# Patient Record
Sex: FEMALE | Race: WHITE | NOT HISPANIC OR LATINO
[De-identification: names, ages, dates, MRNs, and addresses within clinical notes are randomized per-mention and may not be internally consistent; named-entity substitution may affect disease eponyms.]

---

## 2018-05-30 ENCOUNTER — APPOINTMENT (OUTPATIENT)
Dept: FAMILY MEDICINE | Facility: CLINIC | Age: 48
End: 2018-05-30
Payer: COMMERCIAL

## 2018-05-30 VITALS
BODY MASS INDEX: 47.09 KG/M2 | HEIGHT: 66 IN | TEMPERATURE: 99.9 F | WEIGHT: 293 LBS | HEART RATE: 104 BPM | OXYGEN SATURATION: 97 %

## 2018-05-30 DIAGNOSIS — Z00.00 ENCOUNTER FOR GENERAL ADULT MEDICAL EXAMINATION W/OUT ABNORMAL FINDINGS: ICD-10-CM

## 2018-05-30 DIAGNOSIS — Z87.09 PERSONAL HISTORY OF OTHER DISEASES OF THE RESPIRATORY SYSTEM: ICD-10-CM

## 2018-05-30 LAB — CYTOLOGY CVX/VAG DOC THIN PREP: NORMAL

## 2018-05-30 PROCEDURE — 99386 PREV VISIT NEW AGE 40-64: CPT | Mod: 25

## 2018-05-30 PROCEDURE — 87880 STREP A ASSAY W/OPTIC: CPT | Mod: QW

## 2018-05-30 NOTE — COUNSELING
[None] : None [Good understanding] : Patient has a good understanding of lifestyle changes and the steps needed to achieve self management goals [Behavioral health counseling provided] : behavioral health

## 2018-05-30 NOTE — PHYSICAL EXAM
[Well Nourished] : well nourished [Normal Oropharynx] : the oropharynx was normal [Clear to Auscultation] : lungs were clear to auscultation bilaterally [Regular Rhythm] : with a regular rhythm [Normal S1, S2] : normal S1 and S2 [Non Tender] : non-tender [No CVA Tenderness] : no CVA  tenderness [No Rash] : no rash

## 2018-05-31 LAB
APPEARANCE: CLEAR
BACTERIA: ABNORMAL
BILIRUBIN URINE: NEGATIVE
BLOOD URINE: ABNORMAL
COLOR: YELLOW
GLUCOSE QUALITATIVE U: NEGATIVE MG/DL
HYALINE CASTS: 2 /LPF
KETONES URINE: NEGATIVE
LEUKOCYTE ESTERASE URINE: NEGATIVE
MICROSCOPIC-UA: NORMAL
NITRITE URINE: NEGATIVE
PH URINE: 7
PROTEIN URINE: NEGATIVE MG/DL
RED BLOOD CELLS URINE: 2 /HPF
SPECIFIC GRAVITY URINE: 1.01
SQUAMOUS EPITHELIAL CELLS: 2 /HPF
UROBILINOGEN URINE: NEGATIVE MG/DL
WHITE BLOOD CELLS URINE: 3 /HPF

## 2018-06-13 ENCOUNTER — OUTPATIENT (OUTPATIENT)
Dept: OUTPATIENT SERVICES | Facility: HOSPITAL | Age: 48
LOS: 1 days | End: 2018-06-13
Payer: COMMERCIAL

## 2018-06-13 VITALS
OXYGEN SATURATION: 95 % | DIASTOLIC BLOOD PRESSURE: 82 MMHG | SYSTOLIC BLOOD PRESSURE: 140 MMHG | WEIGHT: 293 LBS | TEMPERATURE: 98 F | HEIGHT: 66 IN | RESPIRATION RATE: 18 BRPM | HEART RATE: 98 BPM

## 2018-06-13 DIAGNOSIS — M79.631 PAIN IN RIGHT FOREARM: ICD-10-CM

## 2018-06-13 DIAGNOSIS — Z98.890 OTHER SPECIFIED POSTPROCEDURAL STATES: Chronic | ICD-10-CM

## 2018-06-13 DIAGNOSIS — M79.644 PAIN IN RIGHT FINGER(S): ICD-10-CM

## 2018-06-13 DIAGNOSIS — M65.4 RADIAL STYLOID TENOSYNOVITIS [DE QUERVAIN]: ICD-10-CM

## 2018-06-13 DIAGNOSIS — M25.531 PAIN IN RIGHT WRIST: ICD-10-CM

## 2018-06-13 DIAGNOSIS — Z01.818 ENCOUNTER FOR OTHER PREPROCEDURAL EXAMINATION: ICD-10-CM

## 2018-06-13 LAB
ANION GAP SERPL CALC-SCNC: 8 MMOL/L — SIGNIFICANT CHANGE UP (ref 5–17)
APTT BLD: 31.2 SEC — SIGNIFICANT CHANGE UP (ref 27.5–37.4)
BUN SERPL-MCNC: 13 MG/DL — SIGNIFICANT CHANGE UP (ref 7–23)
CALCIUM SERPL-MCNC: 9.4 MG/DL — SIGNIFICANT CHANGE UP (ref 8.5–10.1)
CHLORIDE SERPL-SCNC: 105 MMOL/L — SIGNIFICANT CHANGE UP (ref 96–108)
CO2 SERPL-SCNC: 27 MMOL/L — SIGNIFICANT CHANGE UP (ref 22–31)
CREAT SERPL-MCNC: 0.78 MG/DL — SIGNIFICANT CHANGE UP (ref 0.5–1.3)
GLUCOSE SERPL-MCNC: 98 MG/DL — SIGNIFICANT CHANGE UP (ref 70–99)
HCG SERPL-ACNC: <1 MIU/ML — SIGNIFICANT CHANGE UP
HCT VFR BLD CALC: 39.1 % — SIGNIFICANT CHANGE UP (ref 34.5–45)
HGB BLD-MCNC: 12.7 G/DL — SIGNIFICANT CHANGE UP (ref 11.5–15.5)
INR BLD: 1.2 RATIO — HIGH (ref 0.88–1.16)
MCHC RBC-ENTMCNC: 28 PG — SIGNIFICANT CHANGE UP (ref 27–34)
MCHC RBC-ENTMCNC: 32.5 GM/DL — SIGNIFICANT CHANGE UP (ref 32–36)
MCV RBC AUTO: 86.3 FL — SIGNIFICANT CHANGE UP (ref 80–100)
NRBC # BLD: 0 /100 WBCS — SIGNIFICANT CHANGE UP (ref 0–0)
PLATELET # BLD AUTO: 279 K/UL — SIGNIFICANT CHANGE UP (ref 150–400)
POTASSIUM SERPL-MCNC: 4.1 MMOL/L — SIGNIFICANT CHANGE UP (ref 3.5–5.3)
POTASSIUM SERPL-SCNC: 4.1 MMOL/L — SIGNIFICANT CHANGE UP (ref 3.5–5.3)
PROTHROM AB SERPL-ACNC: 13.1 SEC — HIGH (ref 9.8–12.7)
RBC # BLD: 4.53 M/UL — SIGNIFICANT CHANGE UP (ref 3.8–5.2)
RBC # FLD: 14.1 % — SIGNIFICANT CHANGE UP (ref 10.3–14.5)
SODIUM SERPL-SCNC: 140 MMOL/L — SIGNIFICANT CHANGE UP (ref 135–145)
WBC # BLD: 11.68 K/UL — HIGH (ref 3.8–10.5)
WBC # FLD AUTO: 11.68 K/UL — HIGH (ref 3.8–10.5)

## 2018-06-13 PROCEDURE — 84702 CHORIONIC GONADOTROPIN TEST: CPT

## 2018-06-13 PROCEDURE — 71046 X-RAY EXAM CHEST 2 VIEWS: CPT

## 2018-06-13 PROCEDURE — 93005 ELECTROCARDIOGRAM TRACING: CPT

## 2018-06-13 PROCEDURE — 80048 BASIC METABOLIC PNL TOTAL CA: CPT

## 2018-06-13 PROCEDURE — G0463: CPT

## 2018-06-13 PROCEDURE — 93010 ELECTROCARDIOGRAM REPORT: CPT | Mod: NC

## 2018-06-13 PROCEDURE — 85610 PROTHROMBIN TIME: CPT

## 2018-06-13 PROCEDURE — 85730 THROMBOPLASTIN TIME PARTIAL: CPT

## 2018-06-13 PROCEDURE — 85027 COMPLETE CBC AUTOMATED: CPT

## 2018-06-13 PROCEDURE — 71046 X-RAY EXAM CHEST 2 VIEWS: CPT | Mod: 26

## 2018-06-13 NOTE — H&P PST ADULT - NSANTHOSAYNRD_GEN_A_CORE
No. DENILSON screening performed.  STOP BANG Legend: 0-2 = LOW Risk; 3-4 = INTERMEDIATE Risk; 5-8 = HIGH Risk

## 2018-06-13 NOTE — H&P PST ADULT - RS GEN PE MLT RESP DETAILS PC
good air movement/respirations non-labored/breath sounds equal/normal/clear to auscultation bilaterally/airway patent

## 2018-06-13 NOTE — H&P PST ADULT - ASSESSMENT
46 y/o female , obese, with c/o pain in the right thumb, scheduled for right radial tunnel release. Pre op testing today. Was seen by PCP 2 weeks ago. Pt stopped taking Phenteramine 2 days ago. Pre op testing today.

## 2018-06-13 NOTE — H&P PST ADULT - HISTORY OF PRESENT ILLNESS
46 y/o obese female 48 y/o obese female with c/o pain in the right thumb on and off for about a year. Takes cannabinoids for painful arthritis. Was seen by Dr Santiago recently, scheduled for right radial tunnel release.

## 2018-06-18 RX ORDER — ACETAMINOPHEN 500 MG
650 TABLET ORAL ONCE
Qty: 0 | Refills: 0 | Status: COMPLETED | OUTPATIENT
Start: 2018-06-20 | End: 2018-06-20

## 2018-06-18 RX ORDER — SODIUM CHLORIDE 9 MG/ML
1000 INJECTION, SOLUTION INTRAVENOUS
Qty: 0 | Refills: 0 | Status: DISCONTINUED | OUTPATIENT
Start: 2018-06-20 | End: 2018-06-20

## 2018-06-19 ENCOUNTER — TRANSCRIPTION ENCOUNTER (OUTPATIENT)
Age: 48
End: 2018-06-19

## 2018-06-19 NOTE — HISTORY OF PRESENT ILLNESS
[FreeTextEntry1] : est [de-identified] : 47 year old female here to establish care and for a full physical examination. The patients complete medical, family and social history was documented and reviewed with the patient.  The patients medications were identified and also reviewed with the patient as well as any allergies to any medications. All active and previous medical problems were identified and discussed with the patient.  Any new problems were documented. Patient is feeling well today.\par going for radial tunnel surgery\par very upset with weight loss, on weight watchers and feels she is losing too slow\par patient also with complaints of sore throat for a few days

## 2018-06-19 NOTE — ASSESSMENT
[FreeTextEntry1] : Patient was counseled on healthy eating habits, on daily exercise and stress relief. All medications and allergies were reviewed with the patient and any adjustments necessary were made. Patient was counseled to try engage in an exercise activity for at least 30 minutes 3-4 times a week.  Patient was advised to eat a diet low in fat and carbohydrates and high in protein, with plenty of vegetables. Patient was advised to try and engage in relaxing activities whenever possible.\par The patients blood was draw in office and will be followed and assessed for any issues.  Patient was told to return to the office if any issues arise.  Unless otherwise stated, the patient is to continue all other medications as previously prescribed.\par \par The diagnosis of obesity was discussed with the patient. The patient was counseled on diet and exercise. Patient was advised to eat a diet low in carbohydrates and low in fat with high protein diet with plenty of vegetables. Patient was counseled to eat small meals throughout the day avoiding carbohydrates, foods high in fat, foods that are fried and fast food.  Patient was advised to monitor fluid intake, to drink at least 8 glasses of pure water a day and reduce/stop intake of all sugar drinks including juice and soda. Patient was advised to try and exercise for 30-40 minutes per day for at least three days a week. Pros and cons of using medical management for weight loss versus diet/exercise alone was discussed. Medication options were provided for the patient and side affects and risks were identified and discussed.  Patient was advised to take any medications as prescribed and to call office or go to the ER immediately if any issues with the medications occur. All questions were answered.\par \par A rapid strep test was done in office due to symptoms consistent with possible pharyngeal infection. Patients results were [ negative] for strep.\par \par

## 2018-06-19 NOTE — HEALTH RISK ASSESSMENT
[No falls in past year] : Patient reported no falls in the past year [0] : 2) Feeling down, depressed, or hopeless: Not at all (0) [Patient reported mammogram was normal] : Patient reported mammogram was normal [Patient reported PAP Smear was normal] : Patient reported PAP Smear was normal [With Significant Other] : lives with significant other [Employed] : employed [] :  [# Of Children ___] : has [unfilled] children [Fully functional (bathing, dressing, toileting, transferring, walking, feeding)] : Fully functional (bathing, dressing, toileting, transferring, walking, feeding) [Fully functional (using the telephone, shopping, preparing meals, housekeeping, doing laundry, using] : Fully functional and needs no help or supervision to perform IADLs (using the telephone, shopping, preparing meals, housekeeping, doing laundry, using transportation, managing medications and managing finances) [Discussed at today's visit] : Advance Directives Discussed at today's visit [] : No [MammogramDate] : 2017 [PapSmearDate] : 2018

## 2018-06-19 NOTE — ADDENDUM
[FreeTextEntry1] :  All Pre-surgical testing reviewed. Patient is medically optimize to undergo radial tunnel surgery.

## 2018-06-20 ENCOUNTER — OUTPATIENT (OUTPATIENT)
Dept: OUTPATIENT SERVICES | Facility: HOSPITAL | Age: 48
LOS: 1 days | End: 2018-06-20
Payer: COMMERCIAL

## 2018-06-20 ENCOUNTER — RESULT REVIEW (OUTPATIENT)
Age: 48
End: 2018-06-20

## 2018-06-20 VITALS
OXYGEN SATURATION: 96 % | TEMPERATURE: 100 F | SYSTOLIC BLOOD PRESSURE: 151 MMHG | HEART RATE: 103 BPM | WEIGHT: 293 LBS | DIASTOLIC BLOOD PRESSURE: 88 MMHG | HEIGHT: 66 IN | RESPIRATION RATE: 18 BRPM

## 2018-06-20 VITALS
SYSTOLIC BLOOD PRESSURE: 146 MMHG | TEMPERATURE: 99 F | OXYGEN SATURATION: 99 % | HEART RATE: 96 BPM | RESPIRATION RATE: 16 BRPM | DIASTOLIC BLOOD PRESSURE: 84 MMHG

## 2018-06-20 DIAGNOSIS — Z01.818 ENCOUNTER FOR OTHER PREPROCEDURAL EXAMINATION: ICD-10-CM

## 2018-06-20 DIAGNOSIS — M79.644 PAIN IN RIGHT FINGER(S): ICD-10-CM

## 2018-06-20 DIAGNOSIS — M79.631 PAIN IN RIGHT FOREARM: ICD-10-CM

## 2018-06-20 DIAGNOSIS — M65.4 RADIAL STYLOID TENOSYNOVITIS [DE QUERVAIN]: ICD-10-CM

## 2018-06-20 DIAGNOSIS — Z98.890 OTHER SPECIFIED POSTPROCEDURAL STATES: Chronic | ICD-10-CM

## 2018-06-20 PROCEDURE — 88304 TISSUE EXAM BY PATHOLOGIST: CPT | Mod: 26

## 2018-06-20 PROCEDURE — 64708 REVISE ARM/LEG NERVE: CPT | Mod: RT

## 2018-06-20 PROCEDURE — 25447 ARTHRP NTRCRP/CRP/MTCR NTRPS: CPT | Mod: RT

## 2018-06-20 PROCEDURE — 88304 TISSUE EXAM BY PATHOLOGIST: CPT

## 2018-06-20 RX ORDER — OXYCODONE AND ACETAMINOPHEN 5; 325 MG/1; MG/1
1 TABLET ORAL EVERY 4 HOURS
Qty: 0 | Refills: 0 | Status: DISCONTINUED | OUTPATIENT
Start: 2018-06-20 | End: 2018-06-21

## 2018-06-20 RX ORDER — ONDANSETRON 8 MG/1
4 TABLET, FILM COATED ORAL ONCE
Qty: 0 | Refills: 0 | Status: DISCONTINUED | OUTPATIENT
Start: 2018-06-20 | End: 2018-06-21

## 2018-06-20 RX ORDER — FEXOFENADINE HCL 30 MG
1 TABLET ORAL
Qty: 0 | Refills: 0 | COMMUNITY

## 2018-06-20 RX ORDER — SODIUM CHLORIDE 9 MG/ML
1000 INJECTION, SOLUTION INTRAVENOUS
Qty: 0 | Refills: 0 | Status: DISCONTINUED | OUTPATIENT
Start: 2018-06-20 | End: 2018-06-21

## 2018-06-20 RX ORDER — CEFAZOLIN SODIUM 1 G
3000 VIAL (EA) INJECTION ONCE
Qty: 0 | Refills: 0 | Status: COMPLETED | OUTPATIENT
Start: 2018-06-20 | End: 2018-06-20

## 2018-06-20 RX ORDER — HYDROMORPHONE HYDROCHLORIDE 2 MG/ML
0.5 INJECTION INTRAMUSCULAR; INTRAVENOUS; SUBCUTANEOUS
Qty: 0 | Refills: 0 | Status: DISCONTINUED | OUTPATIENT
Start: 2018-06-20 | End: 2018-06-21

## 2018-06-20 RX ORDER — ALPRAZOLAM 0.25 MG
1 TABLET ORAL
Qty: 0 | Refills: 0 | COMMUNITY

## 2018-06-20 RX ORDER — MEPERIDINE HYDROCHLORIDE 50 MG/ML
12.5 INJECTION INTRAMUSCULAR; INTRAVENOUS; SUBCUTANEOUS
Qty: 0 | Refills: 0 | Status: DISCONTINUED | OUTPATIENT
Start: 2018-06-20 | End: 2018-06-21

## 2018-06-20 RX ORDER — PHENTERMINE HCL 30 MG
0 CAPSULE ORAL
Qty: 0 | Refills: 0 | COMMUNITY

## 2018-06-20 RX ADMIN — SODIUM CHLORIDE 75 MILLILITER(S): 9 INJECTION, SOLUTION INTRAVENOUS at 10:12

## 2018-06-20 RX ADMIN — Medication 650 MILLIGRAM(S): at 10:12

## 2018-06-20 NOTE — ASU DISCHARGE PLAN (ADULT/PEDIATRIC). - NOTIFY
Fever greater than 101/Pain not relieved by Medications/Numbness, color, or temperature change to extremity/Swelling that continues/Persistent Nausea and Vomiting/Bleeding that does not stop

## 2018-06-20 NOTE — ASU DISCHARGE PLAN (ADULT/PEDIATRIC). - MEDICATION SUMMARY - MEDICATIONS TO TAKE
I will START or STAY ON the medications listed below when I get home from the hospital:    cannabinoids  -- few drops under tongue as needed for pain  -- Indication: For Pain of finger of right hand    phentermine 8 mg oral tablet  -- once daily  -- Indication: For Pain of right forearm    Allegra 180 mg oral tablet  -- 1 tab(s) by mouth once a day  -- Indication: For Pain of right forearm    Xanax 0.25 mg oral tablet  -- 1 tab(s) by mouth prn, As Needed  -- Indication: For Pain of right forearm    NuvaRing 0.120 mg-0.015 mg/24 hours vaginal ring  -- 1 each vaginally every 4 weeks  -- Indication: For Pain of right forearm

## 2018-06-20 NOTE — BRIEF OPERATIVE NOTE - PROCEDURE
<<-----Click on this checkbox to enter Procedure Neuroplasty of major peripheral nerve of arm or leg  06/20/2018    Active  BPINSKY

## 2018-06-22 NOTE — PROGRESS NOTE ADULT - SUBJECTIVE AND OBJECTIVE BOX
Post Op Day _2_ of Anesthesia Pain Management Service    SUBJECTIVE:  The patient is doing well		  OBJECTIVE:   Pain Score:   /10  Therapy:	[ ] IV PCA	[ ] Epidural   [ ] s/p Spinal Opioid	[ ] Peripheral nerve block  	  Vital Signs Last 24 Hrs  T(C): --  T(F): --  HR: --  BP: --  BP(mean): --  RR: --  SpO2: --    ( ) Alert & Oriented     ( ) No motor/sensory block     ( ) Nausea     ( ) Pruritis     ( ) Headache    ( ) Catheter Site Unremarkable    Assessment of Catheter Site:	[ ] Intact		[ ] Other:    ( ) Further Pain Rx Plan:  Oral Pain Medications    COMMENTS:  Phone follow up performed.  Patient was comfortable, numbness is resolving.  Overall patient is doing well.      ANTICOAGULATION STATUS:

## 2018-06-27 ENCOUNTER — APPOINTMENT (OUTPATIENT)
Dept: FAMILY MEDICINE | Facility: CLINIC | Age: 48
End: 2018-06-27
Payer: COMMERCIAL

## 2018-06-27 VITALS
SYSTOLIC BLOOD PRESSURE: 130 MMHG | HEIGHT: 66 IN | OXYGEN SATURATION: 98 % | WEIGHT: 293 LBS | DIASTOLIC BLOOD PRESSURE: 90 MMHG | HEART RATE: 93 BPM | BODY MASS INDEX: 47.09 KG/M2 | BODY MASS INDEX: 47.09 KG/M2 | HEIGHT: 66 IN | WEIGHT: 293 LBS

## 2018-06-27 PROCEDURE — 99213 OFFICE O/P EST LOW 20 MIN: CPT

## 2018-06-27 NOTE — HISTORY OF PRESENT ILLNESS
[de-identified] : 47 year old female is here for a followup visit after surgery on her right arm and for weight loss. Medications and allergies were reviewed and assessed.  There has been no new medications since the last visit. Patient is feeling well with no active changes or issues since Her last visit.\par patient did well with the phentermine.\par

## 2018-06-27 NOTE — ASSESSMENT
[FreeTextEntry1] : The diagnosis of obesity was discussed with the patient. The patient was counseled on diet and exercise. Patient was advised to eat a diet low in carbohydrates and low in fat with high protein diet with plenty of vegetables. Patient was counseled to eat small meals throughout the day avoiding carbohydrates, foods high in fat, foods that are fried and fast food.  Patient was advised to monitor fluid intake, to drink at least 8 glasses of pure water a day and reduce/stop intake of all sugar drinks including juice and soda. Patient was advised to try and exercise for 30-40 minutes per day for at least three days a week. Pros and cons of using medical management for weight loss versus diet/exercise alone was discussed. Medication options were provided for the patient and side affects and risks were identified and discussed.  Patient was advised to take any medications as prescribed and to call office or go to the ER immediately if any issues with the medications occur. All questions were answered.\par \par lost 10 pounds with 3 weeks of active meds.  feeling great\par continue\par \par surgery, going to surgeon tomorrow, feeling good\par \par

## 2018-06-27 NOTE — HEALTH RISK ASSESSMENT
[Patient reported mammogram was normal] : Patient reported mammogram was normal [Patient reported PAP Smear was normal] : Patient reported PAP Smear was normal [With Significant Other] : lives with significant other [Employed] : employed [] :  [# Of Children ___] : has [unfilled] children [Fully functional (bathing, dressing, toileting, transferring, walking, feeding)] : Fully functional (bathing, dressing, toileting, transferring, walking, feeding) [Fully functional (using the telephone, shopping, preparing meals, housekeeping, doing laundry, using] : Fully functional and needs no help or supervision to perform IADLs (using the telephone, shopping, preparing meals, housekeeping, doing laundry, using transportation, managing medications and managing finances) [MammogramDate] : 2017 [PapSmearDate] : 2018 [Discussed at today's visit] : Advance Directives Discussed at today's visit

## 2018-08-01 ENCOUNTER — APPOINTMENT (OUTPATIENT)
Dept: FAMILY MEDICINE | Facility: CLINIC | Age: 48
End: 2018-08-01
Payer: COMMERCIAL

## 2018-08-01 VITALS
BODY MASS INDEX: 47.09 KG/M2 | HEART RATE: 90 BPM | WEIGHT: 293 LBS | DIASTOLIC BLOOD PRESSURE: 100 MMHG | SYSTOLIC BLOOD PRESSURE: 140 MMHG | HEIGHT: 66 IN | OXYGEN SATURATION: 98 % | RESPIRATION RATE: 16 BRPM

## 2018-08-01 PROBLEM — M19.90 UNSPECIFIED OSTEOARTHRITIS, UNSPECIFIED SITE: Chronic | Status: ACTIVE | Noted: 2018-06-13

## 2018-08-01 PROBLEM — T78.40XA ALLERGY, UNSPECIFIED, INITIAL ENCOUNTER: Chronic | Status: ACTIVE | Noted: 2018-06-13

## 2018-08-01 PROBLEM — E66.9 OBESITY, UNSPECIFIED: Chronic | Status: ACTIVE | Noted: 2018-06-13

## 2018-08-01 PROCEDURE — 99214 OFFICE O/P EST MOD 30 MIN: CPT

## 2018-08-01 NOTE — HISTORY OF PRESENT ILLNESS
[de-identified] : 47 year old female is here for a followup visit for obesity. Lost 3 pounds. Medications and allergies were reviewed and assessed.  There has been no new medications since the last visit. Patient is feeling well with no active changes or issues since Her last visit.\par patient did well with the phentermine.\par

## 2018-08-01 NOTE — HEALTH RISK ASSESSMENT
[No falls in past year] : Patient reported no falls in the past year [0] : 2) Feeling down, depressed, or hopeless: Not at all (0) [Patient reported mammogram was normal] : Patient reported mammogram was normal [Patient reported PAP Smear was normal] : Patient reported PAP Smear was normal [With Significant Other] : lives with significant other [Employed] : employed [] :  [# Of Children ___] : has [unfilled] children [Fully functional (bathing, dressing, toileting, transferring, walking, feeding)] : Fully functional (bathing, dressing, toileting, transferring, walking, feeding) [Fully functional (using the telephone, shopping, preparing meals, housekeeping, doing laundry, using] : Fully functional and needs no help or supervision to perform IADLs (using the telephone, shopping, preparing meals, housekeeping, doing laundry, using transportation, managing medications and managing finances) [Discussed at today's visit] : Advance Directives Discussed at today's visit [] : No [[id number]] : 0 [MammogramDate] : 2017 [PapSmearDate] : 2018

## 2018-08-29 ENCOUNTER — APPOINTMENT (OUTPATIENT)
Dept: FAMILY MEDICINE | Facility: CLINIC | Age: 48
End: 2018-08-29
Payer: COMMERCIAL

## 2018-08-29 VITALS
SYSTOLIC BLOOD PRESSURE: 120 MMHG | HEIGHT: 66 IN | WEIGHT: 293 LBS | BODY MASS INDEX: 47.09 KG/M2 | DIASTOLIC BLOOD PRESSURE: 80 MMHG

## 2018-08-29 PROCEDURE — 99213 OFFICE O/P EST LOW 20 MIN: CPT

## 2018-08-29 NOTE — HISTORY OF PRESENT ILLNESS
[de-identified] : 47 year old female is here for a followup visit for obesity management.  Patent gained one pound. Medications and allergies were reviewed and assessed.  There has been no new medications since the last visit. Patient is feeling well with no active changes or issues since Her last visit.\par \par

## 2018-08-29 NOTE — ASSESSMENT
[FreeTextEntry1] : The diagnosis of obesity was discussed with the patient. The patient was counseled on diet and exercise. Patient was advised to eat a diet low in carbohydrates and low in fat with high protein diet with plenty of vegetables. Patient was counseled to eat small meals throughout the day avoiding carbohydrates, foods high in fat, foods that are fried and fast food.  Patient was advised to monitor fluid intake, to drink at least 8 glasses of pure water a day and reduce/stop intake of all sugar drinks including juice and soda. Patient was advised to try and exercise for 30-40 minutes per day for at least three days a week. Pros and cons of using medical management for weight loss versus diet/exercise alone was discussed. Medication options were provided for the patient and side affects and risks were identified and discussed.  Patient was advised to take any medications as prescribed and to call office or go to the ER immediately if any issues with the medications occur. All questions were answered.\par \par lost 15 pounds in total, 30 in total on own.  feeling great but is upset that she gained one pound.  not feeling affects anymore\par continue\par \par \par \par

## 2018-08-29 NOTE — HEALTH RISK ASSESSMENT
[] : No [No falls in past year] : Patient reported no falls in the past year [0] : 2) Feeling down, depressed, or hopeless: Not at all (0) [[id number]] : 0 [Patient reported mammogram was normal] : Patient reported mammogram was normal [Patient reported PAP Smear was normal] : Patient reported PAP Smear was normal [With Significant Other] : lives with significant other [Employed] : employed [] :  [# Of Children ___] : has [unfilled] children [Fully functional (bathing, dressing, toileting, transferring, walking, feeding)] : Fully functional (bathing, dressing, toileting, transferring, walking, feeding) [Fully functional (using the telephone, shopping, preparing meals, housekeeping, doing laundry, using] : Fully functional and needs no help or supervision to perform IADLs (using the telephone, shopping, preparing meals, housekeeping, doing laundry, using transportation, managing medications and managing finances) [MammogramDate] : 2017 [PapSmearDate] : 2018 [Discussed at today's visit] : Advance Directives Discussed at today's visit

## 2018-09-26 ENCOUNTER — APPOINTMENT (OUTPATIENT)
Dept: FAMILY MEDICINE | Facility: CLINIC | Age: 48
End: 2018-09-26
Payer: COMMERCIAL

## 2018-09-26 VITALS
WEIGHT: 290.2 LBS | OXYGEN SATURATION: 97 % | DIASTOLIC BLOOD PRESSURE: 78 MMHG | HEART RATE: 105 BPM | HEIGHT: 66 IN | RESPIRATION RATE: 16 BRPM | BODY MASS INDEX: 46.64 KG/M2 | SYSTOLIC BLOOD PRESSURE: 126 MMHG

## 2018-09-26 PROCEDURE — 99214 OFFICE O/P EST MOD 30 MIN: CPT

## 2018-09-26 NOTE — HEALTH RISK ASSESSMENT
[] : No [No falls in past year] : Patient reported no falls in the past year [0] : 2) Feeling down, depressed, or hopeless: Not at all (0) [[id number]] : 0 [Patient reported mammogram was normal] : Patient reported mammogram was normal [Patient reported PAP Smear was normal] : Patient reported PAP Smear was normal [With Significant Other] : lives with significant other [Employed] : employed [] :  [# Of Children ___] : has [unfilled] children [Fully functional (bathing, dressing, toileting, transferring, walking, feeding)] : Fully functional (bathing, dressing, toileting, transferring, walking, feeding) [Fully functional (using the telephone, shopping, preparing meals, housekeeping, doing laundry, using] : Fully functional and needs no help or supervision to perform IADLs (using the telephone, shopping, preparing meals, housekeeping, doing laundry, using transportation, managing medications and managing finances) [MammogramDate] : 2017 [PapSmearDate] : 2018 [Discussed at today's visit] : Advance Directives Discussed at today's visit

## 2018-09-26 NOTE — ASSESSMENT
[FreeTextEntry1] : The diagnosis of obesity was discussed with the patient. The patient was counseled on diet and exercise. Patient was advised to eat a diet low in carbohydrates and low in fat with high protein diet with plenty of vegetables. Patient was counseled to eat small meals throughout the day avoiding carbohydrates, foods high in fat, foods that are fried and fast food.  Patient was advised to monitor fluid intake, to drink at least 8 glasses of pure water a day and reduce/stop intake of all sugar drinks including juice and soda. Patient was advised to try and exercise for 30-40 minutes per day for at least three days a week. Pros and cons of using medical management for weight loss versus diet/exercise alone was discussed. Medication options were provided for the patient and side affects and risks were identified and discussed.  Patient was advised to take any medications as prescribed and to call office or go to the ER immediately if any issues with the medications occur. All questions were answered.\par \par lost 21 pounds in total, 37 in total on own.  feeling great. still feeling affects\par \par arm is healing, doing well\par \par \par \par

## 2018-09-26 NOTE — HISTORY OF PRESENT ILLNESS
[de-identified] : 47 year old female is here for a followup visit for obesity management.  lost 7 pounds since last month, total 21 pounds. Medications and allergies were reviewed and assessed.  There has been no new medications since the last visit. Patient is feeling well with no active changes or issues since Her last visit.\par \par

## 2018-09-26 NOTE — PHYSICAL EXAM
[Well Nourished] : well nourished [Normal Oropharynx] : the oropharynx was normal [Clear to Auscultation] : lungs were clear to auscultation bilaterally [Regular Rhythm] : with a regular rhythm [Normal S1, S2] : normal S1 and S2 [Non Tender] : non-tender [No CVA Tenderness] : no CVA  tenderness [No Rash] : no rash [Normal Gait] : normal gait [Normal Affect] : the affect was normal [Normal Insight/Judgement] : insight and judgment were intact

## 2018-10-24 ENCOUNTER — MEDICATION RENEWAL (OUTPATIENT)
Age: 48
End: 2018-10-24

## 2018-11-01 ENCOUNTER — MEDICATION RENEWAL (OUTPATIENT)
Age: 48
End: 2018-11-01

## 2018-11-28 ENCOUNTER — APPOINTMENT (OUTPATIENT)
Dept: FAMILY MEDICINE | Facility: CLINIC | Age: 48
End: 2018-11-28
Payer: COMMERCIAL

## 2018-11-28 VITALS
BODY MASS INDEX: 41.98 KG/M2 | HEART RATE: 100 BPM | WEIGHT: 277 LBS | HEIGHT: 68 IN | RESPIRATION RATE: 18 BRPM | OXYGEN SATURATION: 96 %

## 2018-11-28 LAB — CYTOLOGY CVX/VAG DOC THIN PREP: NORMAL

## 2018-11-28 PROCEDURE — 99214 OFFICE O/P EST MOD 30 MIN: CPT

## 2018-11-28 NOTE — HISTORY OF PRESENT ILLNESS
[de-identified] : 47 year old female is here for a followup visit for obesity management.  lost 13 pounds in 2 months, total of 50. Medications and allergies were reviewed and assessed.  There has been no new medications since the last visit. Patient is feeling well with no active changes or issues since Her last visit.\par \par

## 2018-11-28 NOTE — HEALTH RISK ASSESSMENT
[] : No [No falls in past year] : Patient reported no falls in the past year [0] : 2) Feeling down, depressed, or hopeless: Not at all (0) [[id number]] : 0 [Patient reported mammogram was normal] : Patient reported mammogram was normal [Patient reported PAP Smear was normal] : Patient reported PAP Smear was normal [With Significant Other] : lives with significant other [Employed] : employed [] :  [# Of Children ___] : has [unfilled] children [Fully functional (bathing, dressing, toileting, transferring, walking, feeding)] : Fully functional (bathing, dressing, toileting, transferring, walking, feeding) [Fully functional (using the telephone, shopping, preparing meals, housekeeping, doing laundry, using] : Fully functional and needs no help or supervision to perform IADLs (using the telephone, shopping, preparing meals, housekeeping, doing laundry, using transportation, managing medications and managing finances) [MammogramDate] : 2017 [PapSmearDate] : 2018 [Discussed at today's visit] : Advance Directives Discussed at today's visit

## 2018-11-28 NOTE — ASSESSMENT
[FreeTextEntry1] : The diagnosis of obesity was discussed with the patient. The patient was counseled on diet and exercise. Patient was advised to eat a diet low in carbohydrates and low in fat with high protein diet with plenty of vegetables. Patient was counseled to eat small meals throughout the day avoiding carbohydrates, foods high in fat, foods that are fried and fast food.  Patient was advised to monitor fluid intake, to drink at least 8 glasses of pure water a day and reduce/stop intake of all sugar drinks including juice and soda. Patient was advised to try and exercise for 30-40 minutes per day for at least three days a week. Pros and cons of using medical management for weight loss versus diet/exercise alone was discussed. Medication options were provided for the patient and side affects and risks were identified and discussed.  Patient was advised to take any medications as prescribed and to call office or go to the ER immediately if any issues with the medications occur. All questions were answered.\par \par lost 13 pounds in two weeks, 50 in total on own.  feeling great. still feeling affects\par \par arm is healing, but is needed injections\par \par patient having issues at work\par \par \par \par

## 2018-12-29 ENCOUNTER — TRANSCRIPTION ENCOUNTER (OUTPATIENT)
Age: 48
End: 2018-12-29

## 2019-01-07 ENCOUNTER — TRANSCRIPTION ENCOUNTER (OUTPATIENT)
Age: 49
End: 2019-01-07

## 2019-01-30 ENCOUNTER — APPOINTMENT (OUTPATIENT)
Dept: FAMILY MEDICINE | Facility: CLINIC | Age: 49
End: 2019-01-30
Payer: COMMERCIAL

## 2019-01-30 VITALS
DIASTOLIC BLOOD PRESSURE: 90 MMHG | BODY MASS INDEX: 43.9 KG/M2 | SYSTOLIC BLOOD PRESSURE: 146 MMHG | HEIGHT: 66 IN | WEIGHT: 273.13 LBS

## 2019-01-30 PROCEDURE — 99214 OFFICE O/P EST MOD 30 MIN: CPT

## 2019-01-30 NOTE — HISTORY OF PRESENT ILLNESS
[de-identified] : 47 year old female is here for a followup visit for obesity management.  lost 13 pounds in 2 months, total of 50. Medications and allergies were reviewed and assessed.  There has been no new medications since the last visit. Patient is feeling well with no active changes or issues since Her last visit.\par \par

## 2019-01-30 NOTE — ASSESSMENT
[FreeTextEntry1] : The diagnosis of obesity was discussed with the patient. The patient was counseled on diet and exercise. Patient was advised to eat a diet low in carbohydrates and low in fat with high protein diet with plenty of vegetables. Patient was counseled to eat small meals throughout the day avoiding carbohydrates, foods high in fat, foods that are fried and fast food.  Patient was advised to monitor fluid intake, to drink at least 8 glasses of pure water a day and reduce/stop intake of all sugar drinks including juice and soda. Patient was advised to try and exercise for 30-40 minutes per day for at least three days a week. Pros and cons of using medical management for weight loss versus diet/exercise alone was discussed. Medication options were provided for the patient and side affects and risks were identified and discussed.  Patient was advised to take any medications as prescribed and to call office or go to the ER immediately if any issues with the medications occur. All questions were answered.\par \par lost 31 pounds in 6 months, 54 in total on own.  feeling great. still feeling affects\par has been off for meds for a month and would like to restart\par \par \par arm is healing, but is needed injections\par \par patient having issues at work\par \par \par \par

## 2019-01-30 NOTE — HEALTH RISK ASSESSMENT
[No falls in past year] : Patient reported no falls in the past year [0] : 2) Feeling down, depressed, or hopeless: Not at all (0) [Patient reported mammogram was normal] : Patient reported mammogram was normal [Patient reported PAP Smear was normal] : Patient reported PAP Smear was normal [With Significant Other] : lives with significant other [Employed] : employed [] :  [# Of Children ___] : has [unfilled] children [Fully functional (bathing, dressing, toileting, transferring, walking, feeding)] : Fully functional (bathing, dressing, toileting, transferring, walking, feeding) [Fully functional (using the telephone, shopping, preparing meals, housekeeping, doing laundry, using] : Fully functional and needs no help or supervision to perform IADLs (using the telephone, shopping, preparing meals, housekeeping, doing laundry, using transportation, managing medications and managing finances) [Discussed at today's visit] : Advance Directives Discussed at today's visit [] : No [[id number]] : 0 [MammogramDate] : 2017 [PapSmearDate] : 2018

## 2019-03-03 ENCOUNTER — TRANSCRIPTION ENCOUNTER (OUTPATIENT)
Age: 49
End: 2019-03-03

## 2019-03-04 ENCOUNTER — TRANSCRIPTION ENCOUNTER (OUTPATIENT)
Age: 49
End: 2019-03-04

## 2019-03-27 ENCOUNTER — APPOINTMENT (OUTPATIENT)
Dept: FAMILY MEDICINE | Facility: CLINIC | Age: 49
End: 2019-03-27
Payer: COMMERCIAL

## 2019-03-27 VITALS
HEIGHT: 66 IN | RESPIRATION RATE: 18 BRPM | HEART RATE: 72 BPM | DIASTOLIC BLOOD PRESSURE: 80 MMHG | BODY MASS INDEX: 43.39 KG/M2 | WEIGHT: 270 LBS | SYSTOLIC BLOOD PRESSURE: 126 MMHG | OXYGEN SATURATION: 92 %

## 2019-03-27 PROCEDURE — 99214 OFFICE O/P EST MOD 30 MIN: CPT

## 2019-03-27 NOTE — HEALTH RISK ASSESSMENT
[] : No [No falls in past year] : Patient reported no falls in the past year [0] : 2) Feeling down, depressed, or hopeless: Not at all (0) [[id number]] : 0 [Patient reported mammogram was normal] : Patient reported mammogram was normal [Patient reported PAP Smear was normal] : Patient reported PAP Smear was normal [With Significant Other] : lives with significant other [Employed] : employed [] :  [# Of Children ___] : has [unfilled] children [Fully functional (bathing, dressing, toileting, transferring, walking, feeding)] : Fully functional (bathing, dressing, toileting, transferring, walking, feeding) [Fully functional (using the telephone, shopping, preparing meals, housekeeping, doing laundry, using] : Fully functional and needs no help or supervision to perform IADLs (using the telephone, shopping, preparing meals, housekeeping, doing laundry, using transportation, managing medications and managing finances) [MammogramDate] : 2017 [PapSmearDate] : 2018 [Discussed at today's visit] : Advance Directives Discussed at today's visit

## 2019-03-27 NOTE — ASSESSMENT
[FreeTextEntry1] : The diagnosis of obesity was discussed with the patient. The patient was counseled on diet and exercise. Patient was advised to eat a diet low in carbohydrates and low in fat with high protein diet with plenty of vegetables. Patient was counseled to eat small meals throughout the day avoiding carbohydrates, foods high in fat, foods that are fried and fast food.  Patient was advised to monitor fluid intake, to drink at least 8 glasses of pure water a day and reduce/stop intake of all sugar drinks including juice and soda. Patient was advised to try and exercise for 30-40 minutes per day for at least three days a week. Pros and cons of using medical management for weight loss versus diet/exercise alone was discussed. Medication options were provided for the patient and side affects and risks were identified and discussed.  Patient was advised to take any medications as prescribed and to call office or go to the ER immediately if any issues with the medications occur. All questions were answered.\par \par  57 in total on own.  feeling great. still feeling affects\par has been off for meds for a month and would like to restart\par \par doing great\par wishes to restart\par \par \par \par

## 2019-03-27 NOTE — HISTORY OF PRESENT ILLNESS
[de-identified] : 47 year old female is here for a followup visit for obesity management.  total of 57. Medications and allergies were reviewed and assessed.  There has been no new medications since the last visit. Patient is feeling well with no active changes or issues since Her last visit.\par \par

## 2019-05-02 ENCOUNTER — TRANSCRIPTION ENCOUNTER (OUTPATIENT)
Age: 49
End: 2019-05-02

## 2019-05-29 ENCOUNTER — APPOINTMENT (OUTPATIENT)
Dept: FAMILY MEDICINE | Facility: CLINIC | Age: 49
End: 2019-05-29

## 2019-08-12 ENCOUNTER — APPOINTMENT (OUTPATIENT)
Dept: FAMILY MEDICINE | Facility: CLINIC | Age: 49
End: 2019-08-12
Payer: COMMERCIAL

## 2019-08-12 VITALS
WEIGHT: 261 LBS | SYSTOLIC BLOOD PRESSURE: 140 MMHG | HEIGHT: 66 IN | DIASTOLIC BLOOD PRESSURE: 100 MMHG | BODY MASS INDEX: 41.95 KG/M2

## 2019-08-12 DIAGNOSIS — Z87.09 PERSONAL HISTORY OF OTHER DISEASES OF THE RESPIRATORY SYSTEM: ICD-10-CM

## 2019-08-12 PROCEDURE — 99214 OFFICE O/P EST MOD 30 MIN: CPT

## 2019-08-12 NOTE — PHYSICAL EXAM
[Well Nourished] : well nourished [Clear to Auscultation] : lungs were clear to auscultation bilaterally [Normal Oropharynx] : the oropharynx was normal [Regular Rhythm] : with a regular rhythm [Normal S1, S2] : normal S1 and S2 [Non Tender] : non-tender [No CVA Tenderness] : no CVA  tenderness [No Rash] : no rash [Normal Gait] : normal gait [Normal Affect] : the affect was normal [Normal Insight/Judgement] : insight and judgment were intact

## 2019-08-12 NOTE — HISTORY OF PRESENT ILLNESS
[de-identified] : 48 year old female is here for a followup visit for obesity management.  total of 68. Medications and allergies were reviewed and assessed.  There has been no new medications since the last visit. Patient is feeling well with no active changes or issues since Her last visit.\par \par

## 2019-08-12 NOTE — ASSESSMENT
[FreeTextEntry1] : OBESITY\par The diagnosis of obesity was discussed with the patient. The patient was counseled on diet and exercise. Patient was advised to eat a diet low in carbohydrates and low in fat with high protein diet with plenty of vegetables. Patient was counseled to eat small meals throughout the day avoiding carbohydrates, foods high in fat, foods that are fried and fast food.  Patient was advised to monitor fluid intake, to drink at least 8 glasses of pure water a day and reduce/stop intake of all sugar drinks including juice and soda. Patient was advised to try and exercise for 30-40 minutes per day for at least three days a week. Pros and cons of using medical management for weight loss versus diet/exercise alone was discussed. Medication options were provided for the patient and side affects and risks were identified and discussed.  Patient was advised to take any medications as prescribed and to call office or go to the ER immediately if any issues with the medications occur. All questions were answered.\par \par  68 pounds in total on own.  feeling great. still feeling affects\par has been off for meds for three months and would like to restart\par feels she is at a standstill and would like to restart\par \par HYPERTENSION\par The patient has a diagnosis of hypertension.   The diagnosis was discussed with patient and need for medication compliance and possible side affects and risks of noncompliance. 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

## 2019-08-12 NOTE — HEALTH RISK ASSESSMENT
[] : No [No falls in past year] : Patient reported no falls in the past year [0] : 2) Feeling down, depressed, or hopeless: Not at all (0) [[id number]] : 0 [Patient reported mammogram was normal] : Patient reported mammogram was normal [Patient reported PAP Smear was normal] : Patient reported PAP Smear was normal [With Significant Other] : lives with significant other [Employed] : employed [] :  [Fully functional (bathing, dressing, toileting, transferring, walking, feeding)] : Fully functional (bathing, dressing, toileting, transferring, walking, feeding) [# Of Children ___] : has [unfilled] children [Fully functional (using the telephone, shopping, preparing meals, housekeeping, doing laundry, using] : Fully functional and needs no help or supervision to perform IADLs (using the telephone, shopping, preparing meals, housekeeping, doing laundry, using transportation, managing medications and managing finances) [MammogramDate] : 2017 [PapSmearDate] : 2018 [Discussed at today's visit] : Advance Directives Discussed at today's visit

## 2019-10-14 ENCOUNTER — APPOINTMENT (OUTPATIENT)
Dept: FAMILY MEDICINE | Facility: CLINIC | Age: 49
End: 2019-10-14

## 2019-10-22 ENCOUNTER — TRANSCRIPTION ENCOUNTER (OUTPATIENT)
Age: 49
End: 2019-10-22

## 2020-01-16 ENCOUNTER — TRANSCRIPTION ENCOUNTER (OUTPATIENT)
Age: 50
End: 2020-01-16

## 2020-01-17 ENCOUNTER — APPOINTMENT (OUTPATIENT)
Dept: FAMILY MEDICINE | Facility: CLINIC | Age: 50
End: 2020-01-17
Payer: COMMERCIAL

## 2020-01-17 VITALS
BODY MASS INDEX: 41.78 KG/M2 | HEIGHT: 66 IN | WEIGHT: 260 LBS | SYSTOLIC BLOOD PRESSURE: 130 MMHG | DIASTOLIC BLOOD PRESSURE: 90 MMHG

## 2020-01-17 LAB — CYTOLOGY CVX/VAG DOC THIN PREP: NORMAL

## 2020-01-17 PROCEDURE — 99214 OFFICE O/P EST MOD 30 MIN: CPT

## 2020-01-17 NOTE — ASSESSMENT
[FreeTextEntry1] : OBESITY\par The diagnosis of obesity was discussed with the patient. The patient was counseled on diet and exercise. Patient was advised to eat a diet low in carbohydrates and low in fat with high protein diet with plenty of vegetables. Patient was counseled to eat small meals throughout the day avoiding carbohydrates, foods high in fat, foods that are fried and fast food.  Patient was advised to monitor fluid intake, to drink at least 8 glasses of pure water a day and reduce/stop intake of all sugar drinks including juice and soda. Patient was advised to try and exercise for 30-40 minutes per day for at least three days a week. Pros and cons of using medical management for weight loss versus diet/exercise alone was discussed. Medication options were provided for the patient and side affects and risks were identified and discussed.  Patient was advised to take any medications as prescribed and to call office or go to the ER immediately if any issues with the medications occur. All questions were answered.\par \par  68 pounds in total on own.  feeling great. still feeling affects\par has been off for meds for three months and would like to restart\par feels she is at a standstill and would like to restart\par \par HYPERTENSION\par The patient has a diagnosis of hypertension.   The diagnosis was discussed with patient and need for medication compliance and possible side affects and risks of noncompliance. 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

## 2020-01-17 NOTE — HEALTH RISK ASSESSMENT
[] : No [No falls in past year] : Patient reported no falls in the past year [0] : 2) Feeling down, depressed, or hopeless: Not at all (0) [[id number]] : 0 [Patient reported mammogram was normal] : Patient reported mammogram was normal [Patient reported PAP Smear was normal] : Patient reported PAP Smear was normal [With Significant Other] : lives with significant other [Employed] : employed [] :  [# Of Children ___] : has [unfilled] children [Fully functional (bathing, dressing, toileting, transferring, walking, feeding)] : Fully functional (bathing, dressing, toileting, transferring, walking, feeding) [Fully functional (using the telephone, shopping, preparing meals, housekeeping, doing laundry, using] : Fully functional and needs no help or supervision to perform IADLs (using the telephone, shopping, preparing meals, housekeeping, doing laundry, using transportation, managing medications and managing finances) [MammogramDate] : 2017 [PapSmearDate] : 2018 [Discussed at today's visit] : Advance Directives Discussed at today's visit

## 2020-01-17 NOTE — HISTORY OF PRESENT ILLNESS
[de-identified] : 49 year old female is here for a followup visit for obesity management.  total of 68. Medications and allergies were reviewed and assessed.  There has been no new medications since the last visit. Patient is feeling well with no active changes or issues since Her last visit.\par \par

## 2020-01-17 NOTE — PHYSICAL EXAM
[Well Nourished] : well nourished [Clear to Auscultation] : lungs were clear to auscultation bilaterally [Regular Rhythm] : with a regular rhythm [Normal S1, S2] : normal S1 and S2 [No CVA Tenderness] : no CVA  tenderness [No Rash] : no rash [Normal Gait] : normal gait [Normal Affect] : the affect was normal [Normal Insight/Judgement] : insight and judgment were intact

## 2020-02-18 ENCOUNTER — NON-APPOINTMENT (OUTPATIENT)
Age: 50
End: 2020-02-18

## 2020-02-18 ENCOUNTER — APPOINTMENT (OUTPATIENT)
Dept: FAMILY MEDICINE | Facility: CLINIC | Age: 50
End: 2020-02-18
Payer: COMMERCIAL

## 2020-02-18 VITALS
SYSTOLIC BLOOD PRESSURE: 130 MMHG | TEMPERATURE: 98.4 F | WEIGHT: 260 LBS | DIASTOLIC BLOOD PRESSURE: 80 MMHG | BODY MASS INDEX: 41.78 KG/M2 | HEART RATE: 86 BPM | HEIGHT: 66 IN | OXYGEN SATURATION: 98 %

## 2020-02-18 DIAGNOSIS — Z01.818 ENCOUNTER FOR OTHER PREPROCEDURAL EXAMINATION: ICD-10-CM

## 2020-02-18 DIAGNOSIS — E07.9 DISORDER OF THYROID, UNSPECIFIED: ICD-10-CM

## 2020-02-18 PROCEDURE — 36415 COLL VENOUS BLD VENIPUNCTURE: CPT

## 2020-02-18 PROCEDURE — 99214 OFFICE O/P EST MOD 30 MIN: CPT | Mod: 25

## 2020-02-18 PROCEDURE — 93000 ELECTROCARDIOGRAM COMPLETE: CPT

## 2020-02-18 RX ORDER — LEVOTHYROXINE SODIUM 0.03 MG/1
25 TABLET ORAL
Qty: 30 | Refills: 0 | Status: DISCONTINUED | COMMUNITY
Start: 2019-11-15 | End: 2020-02-18

## 2020-02-19 LAB
ALBUMIN SERPL ELPH-MCNC: 4.5 G/DL
ALP BLD-CCNC: 111 U/L
ALT SERPL-CCNC: 16 U/L
ANION GAP SERPL CALC-SCNC: 14 MMOL/L
AST SERPL-CCNC: 10 U/L
BASOPHILS # BLD AUTO: 0.03 K/UL
BASOPHILS NFR BLD AUTO: 0.2 %
BILIRUB SERPL-MCNC: 0.9 MG/DL
BUN SERPL-MCNC: 12 MG/DL
CALCIUM SERPL-MCNC: 9.7 MG/DL
CHLORIDE SERPL-SCNC: 99 MMOL/L
CHOLEST SERPL-MCNC: 190 MG/DL
CHOLEST/HDLC SERPL: 2.8 RATIO
CO2 SERPL-SCNC: 28 MMOL/L
CREAT SERPL-MCNC: 0.65 MG/DL
EOSINOPHIL # BLD AUTO: 0.02 K/UL
EOSINOPHIL NFR BLD AUTO: 0.1 %
ESTIMATED AVERAGE GLUCOSE: 100 MG/DL
GLUCOSE SERPL-MCNC: 85 MG/DL
HBA1C MFR BLD HPLC: 5.1 %
HCG SERPL-MCNC: <1 MIU/ML
HCT VFR BLD CALC: 45.6 %
HDLC SERPL-MCNC: 67 MG/DL
HGB BLD-MCNC: 14.3 G/DL
IMM GRANULOCYTES NFR BLD AUTO: 0.4 %
LDLC SERPL CALC-MCNC: 102 MG/DL
LYMPHOCYTES # BLD AUTO: 2.22 K/UL
LYMPHOCYTES NFR BLD AUTO: 16.4 %
MAN DIFF?: NORMAL
MCHC RBC-ENTMCNC: 29.4 PG
MCHC RBC-ENTMCNC: 31.4 GM/DL
MCV RBC AUTO: 93.6 FL
MONOCYTES # BLD AUTO: 0.79 K/UL
MONOCYTES NFR BLD AUTO: 5.8 %
NEUTROPHILS # BLD AUTO: 10.4 K/UL
NEUTROPHILS NFR BLD AUTO: 77.1 %
PLATELET # BLD AUTO: 254 K/UL
POTASSIUM SERPL-SCNC: 4.4 MMOL/L
PROT SERPL-MCNC: 7.1 G/DL
RBC # BLD: 4.87 M/UL
RBC # FLD: 13.6 %
SODIUM SERPL-SCNC: 141 MMOL/L
TRIGL SERPL-MCNC: 101 MG/DL
TSH SERPL-ACNC: 3.72 UIU/ML
WBC # FLD AUTO: 13.51 K/UL

## 2020-02-19 NOTE — ASSESSMENT
[Patient Optimized for Surgery] : Patient optimized for surgery [No Further Testing Recommended] : no further testing recommended [As per surgery] : as per surgery [FreeTextEntry4] : 49 year old female  is here for medical clearance for an upcoming surgery for liposuction of buffalo hump and under eye area.  All medical problems and medicines were documented and reviewed with the patient. \par Patient was counseled to stop any advil/alieve/aspirin 7 days prior to surgery and was advised to have nothing by mouth from 11 pm the night prior to surgery. \par Medications were reviewed with patient and patient was directed on which medications to be taken and not to be taken prior to surgery.

## 2020-02-19 NOTE — HISTORY OF PRESENT ILLNESS
[No Pertinent Cardiac History] : no history of aortic stenosis, atrial fibrillation, coronary artery disease, recent myocardial infarction, or implantable device/pacemaker [No Pertinent Pulmonary History] : no history of asthma, COPD, sleep apnea, or smoking [No Adverse Anesthesia Reaction] : no adverse anesthesia reaction in self or family member [(Patient denies any chest pain, claudication, dyspnea on exertion, orthopnea, palpitations or syncope)] : Patient denies any chest pain, claudication, dyspnea on exertion, orthopnea, palpitations or syncope [Chronic Anticoagulation] : no chronic anticoagulation [Chronic Kidney Disease] : no chronic kidney disease [Diabetes] : no diabetes [FreeTextEntry1] : liposuction of buffalo hump [FreeTextEntry2] : 3/2/2020 [FreeTextEntry3] : Dr. Hassan [FreeTextEntry4] : 49 year old female  is here for medical clearance for an upcoming surgery for liposuction of buffalo hump and under eye area.  All medical problems and medicines were documented and reviewed with the patient. \par Patient was counseled to stop any advil/alieve/aspirin 7 days prior to surgery and was advised to have nothing by mouth from 11 pm the night prior to surgery. \par Medications were reviewed with patient and patient was directed on which medications to be taken and not to be taken prior to surgery.\par Labs were reviewed with the patient.\par

## 2020-04-29 ENCOUNTER — TRANSCRIPTION ENCOUNTER (OUTPATIENT)
Age: 50
End: 2020-04-29

## 2020-05-13 ENCOUNTER — APPOINTMENT (OUTPATIENT)
Dept: FAMILY MEDICINE | Facility: CLINIC | Age: 50
End: 2020-05-13
Payer: COMMERCIAL

## 2020-05-13 VITALS — WEIGHT: 261 LBS | HEIGHT: 66 IN | BODY MASS INDEX: 41.95 KG/M2

## 2020-05-13 DIAGNOSIS — F41.9 ANXIETY DISORDER, UNSPECIFIED: ICD-10-CM

## 2020-05-13 PROCEDURE — 99214 OFFICE O/P EST MOD 30 MIN: CPT | Mod: 95

## 2020-05-13 NOTE — PHYSICAL EXAM
[No Acute Distress] : no acute distress [Well Nourished] : well nourished [Well Developed] : well developed [Well-Appearing] : well-appearing [No JVD] : no jugular venous distention [No Respiratory Distress] : no respiratory distress  [Normal Affect] : the affect was normal [Normal Insight/Judgement] : insight and judgment were intact [Normal Mood] : the mood was normal

## 2020-05-13 NOTE — HISTORY OF PRESENT ILLNESS
[Home] : at home, [unfilled] , at the time of the visit. [Medical Office: ([facility])___] : at the medical office located in  [Patient] : the patient [Self] : self

## 2020-05-13 NOTE — ASSESSMENT
[FreeTextEntry1] : OBESITY\par The diagnosis of obesity was discussed with the patient. The patient was counseled on diet and exercise. Patient was advised to eat a diet low in carbohydrates and low in fat with high protein diet with plenty of vegetables. Patient was counseled to eat small meals throughout the day avoiding carbohydrates, foods high in fat, foods that are fried and fast food. Patient was advised to monitor fluid intake, to drink at least 8 glasses of pure water a day and reduce/stop intake of all sugar drinks including juice and soda. Patient was advised to try and exercise for 30-40 minutes per day for at least three days a week. Pros and cons of using medical management for weight loss versus diet/exercise alone was discussed. Medication options were provided for the patient and side affects and risks were identified and discussed. Patient was advised to take any medications as prescribed and to call office or go to the ER immediately if any issues with the medications occur. All questions were answered.\par \par  68 pounds in total on own. feeling great. still feeling affects\par has been off for meds for three months and would like to restart\par feels she is at a standstill and would like to restart, gained one point\par \par HYPERTENSION\par The patient has a diagnosis of hypertension. The diagnosis was discussed with patient and need for medication compliance and possible side affects and risks of noncompliance. 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

## 2020-12-16 PROBLEM — Z87.09 HISTORY OF ACUTE PHARYNGITIS: Status: RESOLVED | Noted: 2018-05-30 | Resolved: 2020-12-16

## 2022-05-28 NOTE — ASSESSMENT
[FreeTextEntry1] : The diagnosis of obesity was discussed with the patient. The patient was counseled on diet and exercise. Patient was advised to eat a diet low in carbohydrates and low in fat with high protein diet with plenty of vegetables. Patient was counseled to eat small meals throughout the day avoiding carbohydrates, foods high in fat, foods that are fried and fast food.  Patient was advised to monitor fluid intake, to drink at least 8 glasses of pure water a day and reduce/stop intake of all sugar drinks including juice and soda. Patient was advised to try and exercise for 30-40 minutes per day for at least three days a week. Pros and cons of using medical management for weight loss versus diet/exercise alone was discussed. Medication options were provided for the patient and side affects and risks were identified and discussed.  Patient was advised to take any medications as prescribed and to call office or go to the ER immediately if any issues with the medications occur. All questions were answered.\par \par lost 16 pounds in total, 30 in total on own.  feeling great\par continue\par \par \par \par  30y  @ 19w6d admitted for cervical insufficieny to be discharged home on progesterone

## 2022-10-07 NOTE — H&P PST ADULT - TEACHING/LEARNING CULTURAL CONSIDERATIONS
Called patient to discuss adding Neulasta to tx plan  Reviewed SEs and benefit of Claritin around time of chemo/injection  She is agreeable  none

## 2023-10-09 ENCOUNTER — EMERGENCY (EMERGENCY)
Facility: HOSPITAL | Age: 53
LOS: 1 days | Discharge: ANOTHER HOSPITAL | End: 2023-10-09
Attending: EMERGENCY MEDICINE | Admitting: EMERGENCY MEDICINE
Payer: COMMERCIAL

## 2023-10-09 ENCOUNTER — INPATIENT (INPATIENT)
Facility: HOSPITAL | Age: 53
LOS: 1 days | Discharge: HOME | DRG: 164 | End: 2023-10-11
Attending: INTERNAL MEDICINE | Admitting: INTERNAL MEDICINE
Payer: COMMERCIAL

## 2023-10-09 VITALS
SYSTOLIC BLOOD PRESSURE: 155 MMHG | TEMPERATURE: 98 F | DIASTOLIC BLOOD PRESSURE: 90 MMHG | RESPIRATION RATE: 19 BRPM | OXYGEN SATURATION: 96 % | HEART RATE: 112 BPM

## 2023-10-09 VITALS
RESPIRATION RATE: 20 BRPM | HEIGHT: 66 IN | WEIGHT: 229.06 LBS | DIASTOLIC BLOOD PRESSURE: 86 MMHG | HEART RATE: 130 BPM | OXYGEN SATURATION: 93 % | TEMPERATURE: 98 F | SYSTOLIC BLOOD PRESSURE: 137 MMHG

## 2023-10-09 VITALS
SYSTOLIC BLOOD PRESSURE: 137 MMHG | OXYGEN SATURATION: 93 % | HEART RATE: 115 BPM | DIASTOLIC BLOOD PRESSURE: 81 MMHG | HEIGHT: 66 IN | WEIGHT: 229.94 LBS | TEMPERATURE: 98 F | RESPIRATION RATE: 20 BRPM

## 2023-10-09 DIAGNOSIS — Z98.890 OTHER SPECIFIED POSTPROCEDURAL STATES: Chronic | ICD-10-CM

## 2023-10-09 DIAGNOSIS — I26.99 OTHER PULMONARY EMBOLISM WITHOUT ACUTE COR PULMONALE: ICD-10-CM

## 2023-10-09 LAB
ALBUMIN SERPL ELPH-MCNC: 2.5 G/DL — LOW (ref 3.3–5)
ALBUMIN SERPL ELPH-MCNC: 3.2 G/DL — LOW (ref 3.3–5)
ALP SERPL-CCNC: 78 U/L — SIGNIFICANT CHANGE UP (ref 40–120)
ALP SERPL-CCNC: 80 U/L — SIGNIFICANT CHANGE UP (ref 40–120)
ALT FLD-CCNC: 26 U/L — SIGNIFICANT CHANGE UP (ref 10–45)
ALT FLD-CCNC: 29 U/L — SIGNIFICANT CHANGE UP (ref 12–78)
ANION GAP SERPL CALC-SCNC: 10 MMOL/L — SIGNIFICANT CHANGE UP (ref 5–17)
ANION GAP SERPL CALC-SCNC: 17 MMOL/L — SIGNIFICANT CHANGE UP (ref 5–17)
APTT BLD: 31.7 SEC — SIGNIFICANT CHANGE UP (ref 24.5–35.6)
APTT BLD: 84.4 SEC — HIGH (ref 24.5–35.6)
AST SERPL-CCNC: 18 U/L — SIGNIFICANT CHANGE UP (ref 15–37)
AST SERPL-CCNC: 46 U/L — HIGH (ref 10–40)
BASOPHILS # BLD AUTO: 0.02 K/UL — SIGNIFICANT CHANGE UP (ref 0–0.2)
BASOPHILS NFR BLD AUTO: 0.1 % — SIGNIFICANT CHANGE UP (ref 0–2)
BASOPHILS NFR BLD AUTO: 0.2 % — SIGNIFICANT CHANGE UP (ref 0–2)
BILIRUB SERPL-MCNC: 0.6 MG/DL — SIGNIFICANT CHANGE UP (ref 0.2–1.2)
BUN SERPL-MCNC: 11 MG/DL — SIGNIFICANT CHANGE UP (ref 7–23)
BUN SERPL-MCNC: 9 MG/DL — SIGNIFICANT CHANGE UP (ref 7–23)
CALCIUM SERPL-MCNC: 8.5 MG/DL — SIGNIFICANT CHANGE UP (ref 8.5–10.1)
CALCIUM SERPL-MCNC: 9.2 MG/DL — SIGNIFICANT CHANGE UP (ref 8.4–10.5)
CHLORIDE SERPL-SCNC: 106 MMOL/L — SIGNIFICANT CHANGE UP (ref 96–108)
CHLORIDE SERPL-SCNC: 108 MMOL/L — SIGNIFICANT CHANGE UP (ref 96–108)
CK MB CFR SERPL CALC: 2.1 NG/ML — SIGNIFICANT CHANGE UP (ref 0–3.6)
CO2 SERPL-SCNC: 18 MMOL/L — LOW (ref 22–31)
CO2 SERPL-SCNC: 23 MMOL/L — SIGNIFICANT CHANGE UP (ref 22–31)
CREAT SERPL-MCNC: 0.52 MG/DL — SIGNIFICANT CHANGE UP (ref 0.5–1.3)
CREAT SERPL-MCNC: 0.8 MG/DL — SIGNIFICANT CHANGE UP (ref 0.5–1.3)
D DIMER BLD IA.RAPID-MCNC: 6497 NG/ML DDU — HIGH
EGFR: 113 ML/MIN/1.73M2 — SIGNIFICANT CHANGE UP
EGFR: 90 ML/MIN/1.73M2 — SIGNIFICANT CHANGE UP
EOSINOPHIL # BLD AUTO: 0.02 K/UL — SIGNIFICANT CHANGE UP (ref 0–0.5)
EOSINOPHIL # BLD AUTO: 0.04 K/UL — SIGNIFICANT CHANGE UP (ref 0–0.5)
EOSINOPHIL NFR BLD AUTO: 0.1 % — SIGNIFICANT CHANGE UP (ref 0–6)
EOSINOPHIL NFR BLD AUTO: 0.3 % — SIGNIFICANT CHANGE UP (ref 0–6)
GLUCOSE SERPL-MCNC: 108 MG/DL — HIGH (ref 70–99)
GLUCOSE SERPL-MCNC: 169 MG/DL — HIGH (ref 70–99)
HCG SERPL-ACNC: <1 MIU/ML — SIGNIFICANT CHANGE UP
HCT VFR BLD CALC: 38.7 % — SIGNIFICANT CHANGE UP (ref 34.5–45)
HCT VFR BLD CALC: 39 % — SIGNIFICANT CHANGE UP (ref 34.5–45)
HGB BLD-MCNC: 12.3 G/DL — SIGNIFICANT CHANGE UP (ref 11.5–15.5)
HGB BLD-MCNC: 12.4 G/DL — SIGNIFICANT CHANGE UP (ref 11.5–15.5)
IMM GRANULOCYTES NFR BLD AUTO: 0.4 % — SIGNIFICANT CHANGE UP (ref 0–0.9)
INR BLD: 1.12 RATIO — SIGNIFICANT CHANGE UP (ref 0.85–1.18)
INR BLD: 1.2 RATIO — HIGH (ref 0.85–1.18)
LYMPHOCYTES # BLD AUTO: 1.39 K/UL — SIGNIFICANT CHANGE UP (ref 1–3.3)
LYMPHOCYTES # BLD AUTO: 1.76 K/UL — SIGNIFICANT CHANGE UP (ref 1–3.3)
LYMPHOCYTES # BLD AUTO: 10.1 % — LOW (ref 13–44)
LYMPHOCYTES # BLD AUTO: 14.2 % — SIGNIFICANT CHANGE UP (ref 13–44)
MAGNESIUM SERPL-MCNC: 2.1 MG/DL — SIGNIFICANT CHANGE UP (ref 1.6–2.6)
MCHC RBC-ENTMCNC: 29.6 PG — SIGNIFICANT CHANGE UP (ref 27–34)
MCHC RBC-ENTMCNC: 31.8 GM/DL — LOW (ref 32–36)
MCV RBC AUTO: 93.1 FL — SIGNIFICANT CHANGE UP (ref 80–100)
MCV RBC AUTO: 93.3 FL — SIGNIFICANT CHANGE UP (ref 80–100)
MONOCYTES # BLD AUTO: 0.64 K/UL — SIGNIFICANT CHANGE UP (ref 0–0.9)
MONOCYTES # BLD AUTO: 0.86 K/UL — SIGNIFICANT CHANGE UP (ref 0–0.9)
MONOCYTES NFR BLD AUTO: 4.7 % — SIGNIFICANT CHANGE UP (ref 2–14)
MONOCYTES NFR BLD AUTO: 6.9 % — SIGNIFICANT CHANGE UP (ref 2–14)
NEUTROPHILS # BLD AUTO: 11.58 K/UL — HIGH (ref 1.8–7.4)
NEUTROPHILS # BLD AUTO: 9.69 K/UL — HIGH (ref 1.8–7.4)
NEUTROPHILS NFR BLD AUTO: 78 % — HIGH (ref 43–77)
NEUTROPHILS NFR BLD AUTO: 84.6 % — HIGH (ref 43–77)
NRBC # BLD: 0 /100 WBCS — SIGNIFICANT CHANGE UP (ref 0–0)
NT-PROBNP SERPL-SCNC: 4465 PG/ML — HIGH (ref 0–300)
NT-PROBNP SERPL-SCNC: 5130 PG/ML — HIGH (ref 0–125)
PLATELET # BLD AUTO: 176 K/UL — SIGNIFICANT CHANGE UP (ref 150–400)
PLATELET # BLD AUTO: 190 K/UL — SIGNIFICANT CHANGE UP (ref 150–400)
POTASSIUM SERPL-MCNC: 4 MMOL/L — SIGNIFICANT CHANGE UP (ref 3.5–5.3)
POTASSIUM SERPL-MCNC: 5.2 MMOL/L — SIGNIFICANT CHANGE UP (ref 3.5–5.3)
POTASSIUM SERPL-SCNC: 4 MMOL/L — SIGNIFICANT CHANGE UP (ref 3.5–5.3)
POTASSIUM SERPL-SCNC: 5.2 MMOL/L — SIGNIFICANT CHANGE UP (ref 3.5–5.3)
PROT SERPL-MCNC: 6.9 G/DL — SIGNIFICANT CHANGE UP (ref 6–8.3)
PROT SERPL-MCNC: 7 G/DL — SIGNIFICANT CHANGE UP (ref 6–8.3)
PROTHROM AB SERPL-ACNC: 13.1 SEC — HIGH (ref 9.5–13)
RBC # BLD: 4.15 M/UL — SIGNIFICANT CHANGE UP (ref 3.8–5.2)
RBC # BLD: 4.19 M/UL — SIGNIFICANT CHANGE UP (ref 3.8–5.2)
RBC # FLD: 14 % — SIGNIFICANT CHANGE UP (ref 10.3–14.5)
RBC # FLD: 14.2 % — SIGNIFICANT CHANGE UP (ref 10.3–14.5)
SARS-COV-2 RNA SPEC QL NAA+PROBE: SIGNIFICANT CHANGE UP
SODIUM SERPL-SCNC: 141 MMOL/L — SIGNIFICANT CHANGE UP (ref 135–145)
TROPONIN I, HIGH SENSITIVITY RESULT: 239.5 NG/L — HIGH
TROPONIN T, HIGH SENSITIVITY RESULT: 74 NG/L — HIGH (ref 0–51)
WBC # BLD: 12.42 K/UL — HIGH (ref 3.8–10.5)
WBC # BLD: 13.7 K/UL — HIGH (ref 3.8–10.5)
WBC # FLD AUTO: 12.42 K/UL — HIGH (ref 3.8–10.5)
WBC # FLD AUTO: 13.7 K/UL — HIGH (ref 3.8–10.5)

## 2023-10-09 PROCEDURE — 71275 CT ANGIOGRAPHY CHEST: CPT | Mod: 26,MA

## 2023-10-09 PROCEDURE — 87635 SARS-COV-2 COVID-19 AMP PRB: CPT

## 2023-10-09 PROCEDURE — 82553 CREATINE MB FRACTION: CPT

## 2023-10-09 PROCEDURE — 93005 ELECTROCARDIOGRAM TRACING: CPT

## 2023-10-09 PROCEDURE — 83735 ASSAY OF MAGNESIUM: CPT

## 2023-10-09 PROCEDURE — 93970 EXTREMITY STUDY: CPT | Mod: 26

## 2023-10-09 PROCEDURE — 93010 ELECTROCARDIOGRAM REPORT: CPT

## 2023-10-09 PROCEDURE — 85610 PROTHROMBIN TIME: CPT

## 2023-10-09 PROCEDURE — 83880 ASSAY OF NATRIURETIC PEPTIDE: CPT

## 2023-10-09 PROCEDURE — 85379 FIBRIN DEGRADATION QUANT: CPT

## 2023-10-09 PROCEDURE — 99223 1ST HOSP IP/OBS HIGH 75: CPT

## 2023-10-09 PROCEDURE — 71275 CT ANGIOGRAPHY CHEST: CPT | Mod: MA

## 2023-10-09 PROCEDURE — 80053 COMPREHEN METABOLIC PANEL: CPT

## 2023-10-09 PROCEDURE — 84702 CHORIONIC GONADOTROPIN TEST: CPT

## 2023-10-09 PROCEDURE — 36415 COLL VENOUS BLD VENIPUNCTURE: CPT

## 2023-10-09 PROCEDURE — 85730 THROMBOPLASTIN TIME PARTIAL: CPT

## 2023-10-09 PROCEDURE — 99285 EMERGENCY DEPT VISIT HI MDM: CPT | Mod: 25

## 2023-10-09 PROCEDURE — 96374 THER/PROPH/DIAG INJ IV PUSH: CPT | Mod: XU

## 2023-10-09 PROCEDURE — 84484 ASSAY OF TROPONIN QUANT: CPT

## 2023-10-09 PROCEDURE — 93306 TTE W/DOPPLER COMPLETE: CPT | Mod: 26

## 2023-10-09 PROCEDURE — 93970 EXTREMITY STUDY: CPT

## 2023-10-09 PROCEDURE — 99291 CRITICAL CARE FIRST HOUR: CPT

## 2023-10-09 PROCEDURE — 85025 COMPLETE CBC W/AUTO DIFF WBC: CPT

## 2023-10-09 RX ORDER — HEPARIN SODIUM 5000 [USP'U]/ML
8500 INJECTION INTRAVENOUS; SUBCUTANEOUS ONCE
Refills: 0 | Status: COMPLETED | OUTPATIENT
Start: 2023-10-09 | End: 2023-10-09

## 2023-10-09 RX ORDER — HEPARIN SODIUM 5000 [USP'U]/ML
4000 INJECTION INTRAVENOUS; SUBCUTANEOUS EVERY 6 HOURS
Refills: 0 | Status: DISCONTINUED | OUTPATIENT
Start: 2023-10-09 | End: 2023-10-12

## 2023-10-09 RX ORDER — HEPARIN SODIUM 5000 [USP'U]/ML
INJECTION INTRAVENOUS; SUBCUTANEOUS
Qty: 25000 | Refills: 0 | Status: DISCONTINUED | OUTPATIENT
Start: 2023-10-09 | End: 2023-10-12

## 2023-10-09 RX ORDER — HEPARIN SODIUM 5000 [USP'U]/ML
INJECTION INTRAVENOUS; SUBCUTANEOUS
Qty: 25000 | Refills: 0 | Status: DISCONTINUED | OUTPATIENT
Start: 2023-10-09 | End: 2023-10-10

## 2023-10-09 RX ORDER — LORATADINE 10 MG/1
10 TABLET ORAL ONCE
Refills: 0 | Status: DISCONTINUED | OUTPATIENT
Start: 2023-10-09 | End: 2023-10-11

## 2023-10-09 RX ORDER — HEPARIN SODIUM 5000 [USP'U]/ML
4000 INJECTION INTRAVENOUS; SUBCUTANEOUS EVERY 6 HOURS
Refills: 0 | Status: DISCONTINUED | OUTPATIENT
Start: 2023-10-09 | End: 2023-10-11

## 2023-10-09 RX ORDER — HEPARIN SODIUM 5000 [USP'U]/ML
9000 INJECTION INTRAVENOUS; SUBCUTANEOUS EVERY 6 HOURS
Refills: 0 | Status: DISCONTINUED | OUTPATIENT
Start: 2023-10-09 | End: 2023-10-11

## 2023-10-09 RX ORDER — SODIUM CHLORIDE 9 MG/ML
1000 INJECTION INTRAMUSCULAR; INTRAVENOUS; SUBCUTANEOUS ONCE
Refills: 0 | Status: COMPLETED | OUTPATIENT
Start: 2023-10-09 | End: 2023-10-09

## 2023-10-09 RX ORDER — HEPARIN SODIUM 5000 [USP'U]/ML
8500 INJECTION INTRAVENOUS; SUBCUTANEOUS EVERY 6 HOURS
Refills: 0 | Status: DISCONTINUED | OUTPATIENT
Start: 2023-10-09 | End: 2023-10-12

## 2023-10-09 RX ADMIN — HEPARIN SODIUM 8500 UNIT(S): 5000 INJECTION INTRAVENOUS; SUBCUTANEOUS at 13:56

## 2023-10-09 RX ADMIN — HEPARIN SODIUM 1900 UNIT(S)/HR: 5000 INJECTION INTRAVENOUS; SUBCUTANEOUS at 17:36

## 2023-10-09 RX ADMIN — HEPARIN SODIUM 1800 UNIT(S)/HR: 5000 INJECTION INTRAVENOUS; SUBCUTANEOUS at 13:56

## 2023-10-09 RX ADMIN — SODIUM CHLORIDE 1000 MILLILITER(S): 9 INJECTION INTRAMUSCULAR; INTRAVENOUS; SUBCUTANEOUS at 13:55

## 2023-10-09 NOTE — ED ADULT TRIAGE NOTE - CHIEF COMPLAINT QUOTE
c/o shortness of breath since last night, L leg pain/intermittent swelling, fatigue. denies cp, dizziness, lightheadedness. pmhx abdominoplasty x4 weeks, htn.

## 2023-10-09 NOTE — CONSULT NOTE ADULT - SUBJECTIVE AND OBJECTIVE BOX
Cardiology Consult Note   [Please check [email or web address] password: [email or web address]" for cardiology service schedule and contact information]    History of Present Illness:   Ms. Hinton is a 53 yo F w/ hx of HTN and recent abdominal surgery 4w ago who presented to [place] ED w/ LLE pain and SOB.    Pt reports she underwent abd surg 4w ago. She was immobile for 1w then slowly mobilized. She had her abd drain removed 3d ago. Over the weekend she reported 2d of LLE swelling and pain and then began feeling mild dyspnea last night, notable w/ exertion as well as faint palpitations, feeling like her heart was racing. She then went to [place] ED for further eval. In their ED, pt was afebrile w/ HRs in the 110-130s, BP in the 130s/80s, satting 94% on RA. Labs showed WBC 13.7, normal Hgb and Plts, INR 1.12, D-Dimer 6497, normal Cr 0.8, HS-Trop I of 239.5, Pro-BNP 5130. CTPA showed extensive acute b/l PEs within the L and R main PA and multiple b/l lower order branches w/ an abnormal RV/LV ratio of 1.6 c/w RV strain.. LE Dopplers showed DVT within the L common femoral and profunda femoral veins as well as the L popliteal and posterior tibial veins and superficial thrombus in the L gastrocnemius and small saphenous veins. She was started on anticoagulation w/ heparin bolus and continued on a heparin gtt and a PERT call was initiated w/ ultimate plan for transfer to [facility] ED for further evaluation.    On arrival to the [facility] ED she was HDS w/ HR in the 120s, normal BP and normal SpO2.    Home Meds:  Losartan 50mg qd    PMHx: reviewed, see below  SOCIAL HISTORY:  unchanged      REVIEW OF SYSTEMS:  CV: chest pain (-), palpitation (-), orthopnea (-), PND (-), edema (+)  PULM: SOB (+), cough (-), wheezing (-), hemoptysis (-).   CONST: fever (-), chills (-) or fatigue (-)  GI: abdominal distension (-), abdominal pain (-) , nausea/vomiting (-), hematemesis, (-), melena (-), hematochezia (-)  : dysuria (-), frequency (-), hematuria (-).   NEURO: numbness (-), weakness (-), dizziness (-)  SKIN: itching (-), rash (-)  HEENT:  visual changes (-); vertigo or throat pain (-);  neck stiffness (-)     All other review of systems is negative unless indicated above.   -------------------------------------------------------------------------------------------  PHYSICAL EXAM:  T(C): 36.7 (10-09-23 @ 15:55), Max: 36.7 (10-09-23 @ 15:55)  HR: 115 (10-09-23 @ 15:55) (115 - 115)  BP: 137/81 (10-09-23 @ 15:55) (137/81 - 137/81)  RR: 20 (10-09-23 @ 15:55) (20 - 20)  SpO2: 93% (10-09-23 @ 15:55) (93% - 93%)  Wt(kg): --  I&O's Summary      GEN: NAD, sitting in bed  HEENT: NCAT, EOMI  CV: tachycardic, regular rate, Ns1/s2, no m/r/g, JVP not elevated  RESP: CTA B/l, no w/r/r  ABD: soft, nt, nd  Ext: warm, 2+ pulses, LLE swelling w/ edema > R side  Neuro: AAOx3, no focal deficits    -------------------------------------------------------------------------------------------  LABS:          -------------------------------------------------------------------------------------------  Meds:    -------------------------------------------------------------------------------------------  Cardiovascular Diagnostic Testing:  CTPA:  FINDINGS:    LUNGS AND AIRWAYS: Patent central airways.  Lungs are clear.  PLEURA: No pleural effusion.  MEDIASTINUM AND NICKY: No lymphadenopathy.  VESSELS: Satisfactory bolus. Extensive filling defect in the left and  right main pulmonary artery and in multiple bilateral lower order  branches consistent with acute pulmonary emboli. Abnormal RV/LV ratio  1.62 consistent with right ventricular strain  HEART: Heart size is normal. No pericardial effusion.  CHEST WALL AND LOWER NECK: Within normal limits.  VISUALIZED UPPER ABDOMEN: Subcutaneous stranding visualized upper abdomen   reflects abdominoplasty changes.  BONES: Degenerative changes.    IMPRESSION:  Positive for extensive acute bilateral pulmonary emboli with evidence of  right ventricular strain.    LE Dopplers:  FINDINGS:    RIGHT:  Normal compressibility of the RIGHT common femoral femoral and popliteal  veins. Doppler shows normal spontaneous and phasic flow  No RIGHT calf vein thrombosis is detected.    LEFT:  Normal compressibility of the LEFT common femoral and profunda femoral  veins. There is echogenic acute thrombus within the noncompressible left  femoral popliteal and posterior tibial veins. Superficial thrombus noted  in the left gastrocnemius and small  saphenous vein.    IMPRESSION:  Positive for acute     -------------------------------------------------------------------------------------------  Assessment and Plan:   53 yo F w/ HTN and recent abd surgery 4w ago presenting w/ 2-3d of LLE swelling and 1d of SOB/palpitations found to be mildly tachycardic w/ normal BP and SpO2 and b/l main pulmonary artery PEs and LLE DVTs on imaging w/ elevated pro-BNP and trop c/w intermediate-high risk PE for which the PERT team was consulted.    1. B/l PEs - Initially mildly tachy to 110-130s, normal BP and normal SpO2 on RA. CTPA w/ RV dilation c/f RV strain. Trop and pro-BNP mildly elevated. Overall picture c/w intermediate high risk PE. Started on AC prior to transfer to [facility]. Likely provoked iso recent surgery.  2. LLE DVT - pt found to have LLE DVT within the common femoral, profunda femoral, popliteal and PT arteries. Also w/ superficial DVT within the L gastrocneumis and small saphenous vein. On AC.  3. HTN    Recommendation:  - plan for mechanical thrombectomy in the am  - Continue AC w/ heparin gtt  - Please get a STAT TTE to assess for RV strain  - please get r/p Pro-BNP and stat troponin now  - Please trend trop until peak  - admit to tele, continuos pulse ox    Case discussed w/ Dr. Cedillo, [facility] interventionalist.     *** Recommendations are preliminary until cosigned by the attending.    Mario Brown MD  Cardiology Fellow    For all New Consults and Questions:  [email or web address]   Login: [email or web address]   Cardiology Consult Note   [Please check [email or web address] password: [email or web address]" for cardiology service schedule and contact information]    History of Present Illness:   Ms. Hinton is a 51 yo F w/ hx of HTN and recent abdominal surgery 4w ago who presented to [place] ED w/ LLE pain and SOB.    Pt reports she underwent abd surg 4w ago. She was immobile for 1w then slowly mobilized. She had her abd drain removed 3d ago. Over the weekend she reported 2d of LLE swelling and pain and then began feeling mild dyspnea last night, notable w/ exertion as well as faint palpitations, feeling like her heart was racing. She then went to [place] ED for further eval. In their ED, pt was afebrile w/ HRs in the 110-130s, BP in the 130s/80s, satting 94% on RA. Labs showed WBC 13.7, normal Hgb and Plts, INR 1.12, D-Dimer 6497, normal Cr 0.8, HS-Trop I of 239.5, Pro-BNP 5130. CTPA showed extensive acute b/l PEs within the L and R main PA and multiple b/l lower order branches w/ an abnormal RV/LV ratio of 1.6 c/w RV strain.. LE Dopplers showed DVT within the L common femoral and profunda femoral veins as well as the L popliteal and posterior tibial veins and superficial thrombus in the L gastrocnemius and small saphenous veins. She was started on anticoagulation w/ heparin bolus and continued on a heparin gtt and a PERT call was initiated w/ ultimate plan for transfer to [facility] ED for further evaluation.    On arrival to the [facility] ED she was HDS w/ HR in the 120s, normal BP and normal SpO2.    Home Meds:  Losartan 50mg qd    PMHx: reviewed, see below  SOCIAL HISTORY:  unchanged      REVIEW OF SYSTEMS:  CV: chest pain (-), palpitation (-), orthopnea (-), PND (-), edema (+)  PULM: SOB (+), cough (-), wheezing (-), hemoptysis (-).   CONST: fever (-), chills (-) or fatigue (-)  GI: abdominal distension (-), abdominal pain (-) , nausea/vomiting (-), hematemesis, (-), melena (-), hematochezia (-)  : dysuria (-), frequency (-), hematuria (-).   NEURO: numbness (-), weakness (-), dizziness (-)  SKIN: itching (-), rash (-)  HEENT:  visual changes (-); vertigo or throat pain (-);  neck stiffness (-)     All other review of systems is negative unless indicated above.   -------------------------------------------------------------------------------------------  PHYSICAL EXAM:  T(C): 36.7 (10-09-23 @ 15:55), Max: 36.7 (10-09-23 @ 15:55)  HR: 115 (10-09-23 @ 15:55) (115 - 115)  BP: 137/81 (10-09-23 @ 15:55) (137/81 - 137/81)  RR: 20 (10-09-23 @ 15:55) (20 - 20)  SpO2: 93% (10-09-23 @ 15:55) (93% - 93%)  Wt(kg): --  I&O's Summary      GEN: NAD, sitting in bed  HEENT: NCAT, EOMI  CV: tachycardic, regular rate, Ns1/s2, no m/r/g, JVP not elevated  RESP: CTA B/l, no w/r/r  ABD: soft, nt, nd  Ext: warm, 2+ pulses, LLE swelling w/ edema > R side  Neuro: AAOx3, no focal deficits    -------------------------------------------------------------------------------------------  LABS:          -------------------------------------------------------------------------------------------  Meds:    -------------------------------------------------------------------------------------------  Cardiovascular Diagnostic Testing:  CTPA:  FINDINGS:    LUNGS AND AIRWAYS: Patent central airways.  Lungs are clear.  PLEURA: No pleural effusion.  MEDIASTINUM AND NICKY: No lymphadenopathy.  VESSELS: Satisfactory bolus. Extensive filling defect in the left and  right main pulmonary artery and in multiple bilateral lower order  branches consistent with acute pulmonary emboli. Abnormal RV/LV ratio  1.62 consistent with right ventricular strain  HEART: Heart size is normal. No pericardial effusion.  CHEST WALL AND LOWER NECK: Within normal limits.  VISUALIZED UPPER ABDOMEN: Subcutaneous stranding visualized upper abdomen   reflects abdominoplasty changes.  BONES: Degenerative changes.    IMPRESSION:  Positive for extensive acute bilateral pulmonary emboli with evidence of  right ventricular strain.    LE Dopplers:  FINDINGS:    RIGHT:  Normal compressibility of the RIGHT common femoral femoral and popliteal  veins. Doppler shows normal spontaneous and phasic flow  No RIGHT calf vein thrombosis is detected.    LEFT:  Normal compressibility of the LEFT common femoral and profunda femoral  veins. There is echogenic acute thrombus within the noncompressible left  femoral popliteal and posterior tibial veins. Superficial thrombus noted  in the left gastrocnemius and small  saphenous vein.    IMPRESSION:  Positive for acute     -------------------------------------------------------------------------------------------  Assessment and Plan:   51 yo F w/ HTN and recent abd surgery 4w ago presenting w/ 2-3d of LLE swelling and 1d of SOB/palpitations found to be mildly tachycardic w/ normal BP and SpO2 and b/l main pulmonary artery PEs and LLE DVTs on imaging w/ elevated pro-BNP and trop c/w intermediate-high risk PE for which the PERT team was consulted.    1. B/l PEs - Initially mildly tachy to 110-130s, normal BP and normal SpO2 on RA. CTPA w/ RV dilation c/f RV strain. Trop and pro-BNP mildly elevated. Overall picture c/w intermediate high risk PE. Started on AC prior to transfer to [facility]. Likely provoked iso recent surgery.  2. LLE DVT - pt found to have LLE DVT within the common femoral, profunda femoral, popliteal and PT arteries. Also w/ superficial DVT within the L gastrocneumis and small saphenous vein. On AC.  3. HTN    Recommendation:  - plan for mechanical thrombectomy in the am  - Continue AC w/ heparin gtt  - Please get a STAT TTE to assess for RV strain  - please get r/p Pro-BNP and stat troponin now  - Please trend trop until peak  - admit to tele, continuos pulse ox    Case discussed w/ Dr. Cedillo, [facility] interventionalist.     *** Recommendations are preliminary until cosigned by the attending.    Mario Brown MD  Cardiology Fellow    For all New Consults and Questions:  [email or web address]   Login: [email or web address]   Cardiology Consult Note   [Please check [email or web address] password: [email or web address]" for cardiology service schedule and contact information]    History of Present Illness:   Ms. Hinton is a 53 yo F w/ hx of HTN and recent abdominal surgery 4w ago who presented to [place] ED w/ LLE pain and SOB.    Pt reports she underwent abd surg 4w ago. She was immobile for 1w then slowly mobilized. She had her abd drain removed 3d ago. Over the weekend she reported 2d of LLE swelling and pain and then began feeling mild dyspnea last night, notable w/ exertion as well as faint palpitations, feeling like her heart was racing. She then went to [place] ED for further eval. In their ED, pt was afebrile w/ HRs in the 110-130s, BP in the 130s/80s, satting 94% on RA. Labs showed WBC 13.7, normal Hgb and Plts, INR 1.12, D-Dimer 6497, normal Cr 0.8, HS-Trop I of 239.5, Pro-BNP 5130. CTPA showed extensive acute b/l PEs within the L and R main PA and multiple b/l lower order branches w/ an abnormal RV/LV ratio of 1.6 c/w RV strain.. LE Dopplers showed DVT within the L common femoral and profunda femoral veins as well as the L popliteal and posterior tibial veins and superficial thrombus in the L gastrocnemius and small saphenous veins. She was started on anticoagulation w/ heparin bolus and continued on a heparin gtt and a PERT call was initiated w/ ultimate plan for transfer to [facility] ED for further evaluation.    On arrival to the [facility] ED she was HDS w/ HR in the 120s, normal BP and normal SpO2.    Home Meds:  Losartan 50mg qd    PMHx: reviewed, see below  SOCIAL HISTORY:  unchanged      REVIEW OF SYSTEMS:  CV: chest pain (-), palpitation (-), orthopnea (-), PND (-), edema (+)  PULM: SOB (+), cough (-), wheezing (-), hemoptysis (-).   CONST: fever (-), chills (-) or fatigue (-)  GI: abdominal distension (-), abdominal pain (-) , nausea/vomiting (-), hematemesis, (-), melena (-), hematochezia (-)  : dysuria (-), frequency (-), hematuria (-).   NEURO: numbness (-), weakness (-), dizziness (-)  SKIN: itching (-), rash (-)  HEENT:  visual changes (-); vertigo or throat pain (-);  neck stiffness (-)     All other review of systems is negative unless indicated above.   -------------------------------------------------------------------------------------------  PHYSICAL EXAM:  T(C): 36.7 (10-09-23 @ 15:55), Max: 36.7 (10-09-23 @ 15:55)  HR: 115 (10-09-23 @ 15:55) (115 - 115)  BP: 137/81 (10-09-23 @ 15:55) (137/81 - 137/81)  RR: 20 (10-09-23 @ 15:55) (20 - 20)  SpO2: 93% (10-09-23 @ 15:55) (93% - 93%)  Wt(kg): --  I&O's Summary      GEN: NAD, sitting in bed  HEENT: NCAT, EOMI  CV: tachycardic, regular rate, Ns1/s2, no m/r/g, JVP not elevated  RESP: CTA B/l, no w/r/r  ABD: soft, nt, nd  Ext: warm, 2+ pulses, LLE swelling w/ edema > R side  Neuro: AAOx3, no focal deficits    -------------------------------------------------------------------------------------------  LABS:          -------------------------------------------------------------------------------------------  Meds:    -------------------------------------------------------------------------------------------  Cardiovascular Diagnostic Testing:  CTPA:  FINDINGS:    LUNGS AND AIRWAYS: Patent central airways.  Lungs are clear.  PLEURA: No pleural effusion.  MEDIASTINUM AND NICKY: No lymphadenopathy.  VESSELS: Satisfactory bolus. Extensive filling defect in the left and  right main pulmonary artery and in multiple bilateral lower order  branches consistent with acute pulmonary emboli. Abnormal RV/LV ratio  1.62 consistent with right ventricular strain  HEART: Heart size is normal. No pericardial effusion.  CHEST WALL AND LOWER NECK: Within normal limits.  VISUALIZED UPPER ABDOMEN: Subcutaneous stranding visualized upper abdomen   reflects abdominoplasty changes.  BONES: Degenerative changes.    IMPRESSION:  Positive for extensive acute bilateral pulmonary emboli with evidence of  right ventricular strain.    LE Dopplers:  FINDINGS:    RIGHT:  Normal compressibility of the RIGHT common femoral femoral and popliteal  veins. Doppler shows normal spontaneous and phasic flow  No RIGHT calf vein thrombosis is detected.    LEFT:  Normal compressibility of the LEFT common femoral and profunda femoral  veins. There is echogenic acute thrombus within the noncompressible left  femoral popliteal and posterior tibial veins. Superficial thrombus noted  in the left gastrocnemius and small  saphenous vein.    IMPRESSION:  Positive for acute     -------------------------------------------------------------------------------------------  Assessment and Plan:   53 yo F w/ HTN and recent abd surgery 4w ago presenting w/ 2-3d of LLE swelling and 1d of SOB/palpitations found to be mildly tachycardic w/ normal BP and SpO2 and b/l main pulmonary artery PEs and LLE DVTs on imaging w/ elevated pro-BNP and trop c/w intermediate-high risk PE for which the PERT team was consulted.    1. B/l PEs - Initially mildly tachy to 110-130s, normal BP and normal SpO2 on RA. CTPA w/ RV dilation c/f RV strain. Trop and pro-BNP mildly elevated. Overall picture c/w intermediate high risk PE. Started on AC prior to transfer to [facility]. Likely provoked iso recent surgery.  2. LLE DVT - pt found to have LLE DVT within the common femoral, profunda femoral, popliteal and PT arteries. Also w/ superficial DVT within the L gastrocneumis and small saphenous vein. On AC.  3. HTN    Recommendation:  - plan for mechanical thrombectomy in the am  - Continue AC w/ heparin gtt  - Please get a STAT TTE to assess for RV strain  - please get r/p Pro-BNP and stat troponin now  - Please trend trop until peak  - admit to tele, continuos pulse ox    Case discussed w/ Dr. Cedillo, [facility] interventionalist.     *** Recommendations are preliminary until cosigned by the attending.    Mario Brown MD  Cardiology Fellow    For all New Consults and Questions:  [email or web address]   Login: [email or web address]

## 2023-10-09 NOTE — ED ADULT NURSE REASSESSMENT NOTE - NS ED NURSE REASSESS COMMENT FT1
Pt is AOX4, Iv fluids and Hep infusing at 18ml/hr. Bolus given as per orders, tolerating well. No adverse reaction noted. Pt is aware of purpose of medication being given at this time. Pt continues to be monitored CM/ in place. Care ongoing.

## 2023-10-09 NOTE — ED ADULT NURSE NOTE - OBJECTIVE STATEMENT
Pt is AOX4, Came to the ED with c/o shortness of breath since last night, L leg pain/intermittent swelling, fatigue. Pt denies HA/dizziness/lightheadedness when ambulating. s/p pmhx abdominoplasty x4 weeks, HTN. Pt is able to ambulate without assistance. Does not take any daily medication. Denies CP/palpitations. Denies n/v. Pt noticed b/i leg swelling due to surgery and calf pain on L leg. No redness noted. No hx of dvts. Pt is able to speal clearly, O2 98% on room air. No allergies to medicine as per pt . Spouse at bedside. pending radiology and lab results. EKG completed, CM/ in place. Care ongoing.

## 2023-10-09 NOTE — ED ADULT TRIAGE NOTE - NS ED NURSE AMBULANCES
[facility]

## 2023-10-09 NOTE — ED PROVIDER NOTE - CARE PLAN
Principal Discharge DX:	Acute pulmonary embolism  Secondary Diagnosis:	Acute deep vein thrombosis (DVT)   1

## 2023-10-09 NOTE — ED PROVIDER NOTE - PHYSICAL EXAMINATION
GEN: Pt non-toxic in NAD, alert.  PSYCH: Affect and mood appropriate.  EYES: Sclera white w/o injection, EOMI.  ENT: MMM. Neck supple. Airway patent.  RESP: Normal work of breathing. CTA b/l, no wheezes, rales, or rhonchi.   CARDIAC: Tachy 115, clear distinct S1, S2, no appreciable murmurs.  ABD: Soft, non-tender.  MSK: WRIGHT spontaneously.  NEURO: No focal motor or sensory deficits.  VASC: Strong distal pulses. LLE swelling > RLE.  SKIN: No rashes or lesions.

## 2023-10-09 NOTE — ED PROVIDER NOTE - NSICDXPASTSURGICALHX_GEN_ALL_CORE_FT
PAST SURGICAL HISTORY:  H/O sinus surgery 20 yrs ago    S/P bilateral breast reduction 20 yrs ago

## 2023-10-09 NOTE — ED ADULT NURSE NOTE - OBJECTIVE STATEMENT
patient is a 53 y/o F with no pertinent PMH transfer from [place] c/o DVT. patient states that she had an abdominoplasty 4 weeks ago and for about 2 days she has been experiencing leg swelling and cramping, left >right and last night she developed SOB which caused her to go to [place]. at [place] patient was found to have acute left leg DVT and "small acute" scattered PEs.  patient was given 8500 U heparin bolus at 1342 with heparin drip infusing at 1800 U/hr started at 1342. patient endorsing mild left leg cramping at this time however does not request pain medications at this time. patient is a&ox4, PERRL. respirations even and unlabored. abdomen soft, nondistended. skin intact. patient ambulatory with steady gait noted. denies fevers/chills, numbness/tingling, weakness, headache, dizziness, vision changes, cp, sob, cough, abd pain, n/v/d, dysuria, hematuria, bloody stools, back pain. patient is a 53 y/o F with no pertinent PMH transfer from [place] c/o DVT. patient states that she had an abdominoplasty 4 weeks ago and for about 2 days she has been experiencing leg swelling and cramping, left >right and last night she developed SOB which caused her to go to [place]. at [place] patient was found to have acute left leg DVT and "small acute" scattered PEs.  patient was given 8500 U heparin bolus at 1342 with heparin drip infusing at 1800 U/hr started at 1342. patient endorsing mild left leg cramping at this time however does not request pain medications at this time. patient is a&ox4, PERRL. respirations even and unlabored. abdomen soft, nondistended. skin intact. patient ambulatory with steady gait noted. denies fevers/chills, numbness/tingling, weakness, headache, dizziness, vision changes, cp, sob, cough, abd pain, n/v/d, dysuria, hematuria, bloody stools, back pain. patient is a 51 y/o F with no pertinent PMH transfer from [place] c/o DVT. patient states that she had an abdominoplasty 4 weeks ago and for about 2 days she has been experiencing leg swelling and cramping, left >right and last night she developed SOB which caused her to go to [place]. at [place] patient was found to have acute left leg DVT and "small acute" scattered PEs.  patient was given 8500 U heparin bolus at 1342 with heparin drip infusing at 1800 U/hr started at 1342. patient endorsing mild left leg cramping at this time however does not request pain medications at this time. patient is a&ox4, PERRL. respirations even and unlabored. abdomen soft, nondistended. skin intact. patient ambulatory with steady gait noted. denies fevers/chills, numbness/tingling, weakness, headache, dizziness, vision changes, cp, sob, cough, abd pain, n/v/d, dysuria, hematuria, bloody stools, back pain.

## 2023-10-09 NOTE — ED ADULT NURSE NOTE - NSFALLUNIVINTERV_ED_ALL_ED
Bed/Stretcher in lowest position, wheels locked, appropriate side rails in place/Call bell, personal items and telephone in reach/Instruct patient to call for assistance before getting out of bed/chair/stretcher/Non-slip footwear applied when patient is off stretcher/[place] to call system/Physically safe environment - no spills, clutter or unnecessary equipment/Purposeful proactive rounding/Room/bathroom lighting operational, light cord in reach

## 2023-10-09 NOTE — ED PROVIDER NOTE - NSICDXFAMILYHX_GEN_ALL_CORE_FT
FAMILY HISTORY:  Mother  Still living? Yes, Estimated age: 61-70  Hypertension, Age at diagnosis: Age Unknown

## 2023-10-09 NOTE — ED PROVIDER NOTE - CRITICAL CARE ATTENDING CONTRIBUTION TO CARE
Attending MD Muro:   I personally have seen and examined this patient. I have performed a substantive portion of the visit including all aspects of the medical decision making.   Physician assistant note reviewed and agree on plan of care and except where noted.        52-year-old woman with a history of hypertension is presenting as transfer from outside hospital for bilateral pulmonary emboli.  Patient reports that she had abdominal plasty 1 month prior but no other surgeries.  At outside hospital she was found to have bilateral pulmonary emboli with CT evidence of right heart dilatation, patient did have elevated troponin and BNP there as well.  Patient is on heparin infusion.  Patient at rest has no shortness of breath.    Patient's vital signs are notable for heart rate 115 blood pressures 130 systolic, oxygen saturation 93 to 94% on room air.  She is well-appearing sitting in the stretcher with no increased work of breathing.  Heart rate regular but tachycardic clear lungs.  Extremities warm to the touch.    ECG recorded at 1711 independently interpreted by me at 1718 and shows sinus tachycardia, normal axis normal intervals Q waves lead III and lead aVF.  No ST elevation no ST depression.    Patient presenting for evaluation of pulmonary embolism.  Evidence of RV strain by CT and biomarkers.  Hemodynamically stable other than tachycardia at this time.  Patient with submassive PE.  Patient was seen by vascular cardiology service and at this time they have no plans for emergent endovascular therapy at this time.  We will continue heparin infusion.  Stat TTE was ordered at request of cardiology.          *The above represents an initial assessment/impression. Please refer to progress notes for potential changes in patient clinical course*

## 2023-10-09 NOTE — ED PROVIDER NOTE - OBJECTIVE STATEMENT
52-year-old female past medical history of hypertension, presents to ED as transfer from [facility] on heparin drip for bilateral PE w/ right heart strain and left lower extremity DVT.  Patient status post abdominoplasty approximately 1 month ago.  On Friday felt left cramping while walking which worsened over the weekend with swelling and then developed shortness of breath last night.  No history of blood clots.  Never smoker.  Uses vaginal ring for contraceptive. 52-year-old female past medical history of hypertension, presents to ED as transfer from [facility] on heparin drip for bilateral PE w/ right heart strain and left lower extremity DVT.  Patient status post abdominoplasty approximately 1 month ago.  On Friday felt left cramping while walking which worsened over the weekend with swelling and then developed shortness of breath last night.  No history of blood clots.  Never smoker.  Uses vaginal ring for contraceptive. 52-year-old female past medical history of hypertension, presents to ED as transfer from [facility] on heparin drip for bilateral PE w/ right heart strain and left lower extremity DVT.  Patient status post abdominoplasty approximately 1 month ago.  On Friday felt left cramping while walking which worsened over the weekend with swelling and then developed shortness of breath last night.  No history of blood clots.  Never smoker.  Uses vaginal ring for contraceptive.

## 2023-10-09 NOTE — ED ADULT TRIAGE NOTE - HOSPITALS/PSYCHIATRIC FACILITIES
[facility]

## 2023-10-09 NOTE — ED PROVIDER NOTE - PROGRESS NOTE DETAILS
Kingsley Carroll PA-C: Patient seen by vascular cardiology upon arrival on the unit in ED.  Recommending telemetry medicine admission and plan for mechanical thrombectomy in a.m.  Echo tech currently getting TTE.  PMD unaffiliated patient endorsed to unattached hospitalist.

## 2023-10-09 NOTE — H&P ADULT - TIME BILLING
Admission H&P including bedside history ,examination ,reviewing test results and treatement plan. Consult noted.

## 2023-10-09 NOTE — ED ADULT NURSE NOTE - ED STAT RN HANDOFF DETAILS
Report given to receiving change of shift ALFREDO POSEY patient is in no acute distress. Patient vital signs stable, plan of care explained.

## 2023-10-09 NOTE — ED ADULT NURSE NOTE - NSFALLRISKFACTORS_ED_ALL_ED
Patient:   BRITTANY SOLANO            MRN: [id number]            FIN: [id number]              Age:   75 years     Sex:  FEMALE     :  46   Associated Diagnoses:   None   Author:   REGGIE DRAPER     Subjective         History of Present Illness   Taken to IR for ultrasound guided bx of the liver. Performed today.  Hgb stable at 7.5.     Physical Examination   VS/Measurements     Vitals between:   29-MAR-2020 15:33:58   TO   30-MAR-2020 15:33:58                   LAST RESULT MINIMUM MAXIMUM  Temperature 38.7 36.8 38.7  Heart Rate 98 69 100  Respiratory Rate 18 16 18  NISBP           145 114 149  NIDBP           75 58 75  NIMBP           86 82 92  SpO2                    93 93 100    General:  Alert and oriented, No acute distress.    HENT:  Normocephalic, Oral mucosa is moist.    Respiratory:  Respirations are non-labored, Breath sounds are equal.   Cardiovascular:  Normal rate, Regular rhythm.    Gastrointestinal:  Soft, Non-tender.    Musculoskeletal     No tenderness.     No swelling.     Integumentary:  Warm, Dry.    Neurologic:  Alert, Oriented.          Review / Management   Laboratory results:     Labs between:  29-MAR-2020 15:34 to 30-MAR-2020 15:34  CBC:                 WBC  HgB  Hct  Plt  MCV  RDW   30-MAR-2020 (H) 12.6  (L) 7.5  (L) 22.8  142  (L) 74.3  (H) 26.9   DIFF:                 Seg  Neutroph//ABS  Lymph//ABS  Mono//ABS  EOS/ABS  30-MAR-2020 NOT APPLICABLE  82 // (H) 10.3  6 // (L) 0.8  10 // (H) 1.3  1 // 0.1  BMP:                 Na  Cl  BUN  Glu   30-MAR-2020 137  99  (H) 46  (H) 102                              K  CO2  Cr  Ca                              4.1  28  (H) 5.57  8.6                  .      Impression and Plan   1- Hematuria, HX BLADDER CA.  is consulted   2- multiple lesions on CT cap showed lung mets, liver mets, renal cystic masses, bladder thickening  c/w mets disease    tumor markers are NEG,   - IR of the liver obtained today 3/30/20 and pending  - bone scan or  outpatient PET to be performed   3- anemia and thrombocytopenia    - no iron def, or b12/folate    - thrombocytopenia has resolved      high ferritin and LD are probably relfective of dulce maria tumor burden    possible bone marrow or bone mets    hgb stable today, tranfuse only if <7  We will follow  Scarlett Spain MD  Heme/Onc   No indicators present

## 2023-10-09 NOTE — ED PROVIDER NOTE - OBJECTIVE STATEMENT
52-year-old female presents to the emergency department states that 2 days ago she had left leg discomfort and swelling greater than right, last night experiencing shortness of breath and palpitations.  Patient states that she had abdominoplasty 1 month ago. 52-year-old female presents to the emergency department states that 2 days ago she had left leg discomfort and swelling greater on her right leg, last night experiencing shortness of breath and palpitations.  Patient states that she had abdominoplasty 1 month ago. 52-year-old female presents to the emergency department states that 2 days ago she had left leg discomfort and swelling, last night experiencing shortness of breath and palpitations.  Patient states that she had abdominoplasty 1 month ago.

## 2023-10-09 NOTE — H&P ADULT - HISTORY OF PRESENT ILLNESS
51 yo F w/ hx of HTN and recent abdominal surgery 4w ago who presented to [facility] ED w/ LLE pain and SOB. Pt reports she underwent abd surg 4w ago. She was immobile for 1w then slowly mobilized. She had her abd drain removed 3d ago. Over the weekend she reported 2d of LLE swelling and pain and then began feeling mild dyspnea last night, notable w/ exertion as well as faint palpitations, feeling like her heart was racing. She then went to [facility] ED for further eval. In their ED, pt was afebrile w/ HRs in the 110-130s, BP in the 130s/80s, satting 94% on RA. Labs showed WBC 13.7, normal Hgb and Plts, INR 1.12, D-Dimer 6497, normal Cr 0.8, HS-Trop I of 239.5, Pro-BNP 5130. CTPA showed extensive acute b/l PEs within the L and R main PA and multiple b/l lower order branches w/ an abnormal RV/LV ratio of 1.6 c/w RV strain.. LE Dopplers showed DVT within the L common femoral and profunda femoral veins as well as the L popliteal and posterior tibial veins and superficial thrombus in the L gastrocnemius and small saphenous veins. She was started on anticoagulation w/ heparin bolus and continued on a heparin gtt and a PERT call was initiated w/ ultimate plan for transfer to [facility] ED for further evaluation.     53 yo F w/ hx of HTN and recent abdominal surgery 4w ago who presented to [facility] ED w/ LLE pain and SOB. Pt reports she underwent abd surg 4w ago. She was immobile for 1w then slowly mobilized. She had her abd drain removed 3d ago. Over the weekend she reported 2d of LLE swelling and pain and then began feeling mild dyspnea last night, notable w/ exertion as well as faint palpitations, feeling like her heart was racing. She then went to [facility] ED for further eval. In their ED, pt was afebrile w/ HRs in the 110-130s, BP in the 130s/80s, satting 94% on RA. Labs showed WBC 13.7, normal Hgb and Plts, INR 1.12, D-Dimer 6497, normal Cr 0.8, HS-Trop I of 239.5, Pro-BNP 5130. CTPA showed extensive acute b/l PEs within the L and R main PA and multiple b/l lower order branches w/ an abnormal RV/LV ratio of 1.6 c/w RV strain.. LE Dopplers showed DVT within the L common femoral and profunda femoral veins as well as the L popliteal and posterior tibial veins and superficial thrombus in the L gastrocnemius and small saphenous veins. She was started on anticoagulation w/ heparin bolus and continued on a heparin gtt and a PERT call was initiated w/ ultimate plan for transfer to [facility] ED for further evaluation.     53 yo F w/ hx of HTN and recent abdominal surgery 4w ago who presented to [facility] ED w/ LLE pain and SOB. Pt reports she underwent abd surg 4w ago. She was immobile for 1w then slowly mobilized. She had her abd drain removed 3d ago. Over the weekend she reported 2d of LLE swelling and pain and then began feeling mild dyspnea last night, notable w/ exertion as well as faint palpitations, feeling like her heart was racing. She then went to [facility] ED for further eval. In their ED, pt was afebrile w/ HRs in the 110-130s, BP in the 130s/80s, satting 94% on RA. Labs showed WBC 13.7, normal Hgb and Plts, INR 1.12, D-Dimer 6497, normal Cr 0.8, HS-Trop I of 239.5, Pro-BNP 5130. CTPA showed extensive acute b/l PEs within the L and R main PA and multiple b/l lower order branches w/ an abnormal RV/LV ratio of 1.6 c/w RV strain.. LE Dopplers showed DVT within the L common femoral and profunda femoral veins as well as the L popliteal and posterior tibial veins and superficial thrombus in the L gastrocnemius and small saphenous veins. She was started on anticoagulation w/ heparin bolus and continued on a heparin gtt and a PERT call was initiated w/ ultimate plan for transfer to [facility] ED for further evaluation.

## 2023-10-09 NOTE — H&P ADULT - ASSESSMENT
53 yo F w/ hx of HTN and recent abdominal surgery 4w ago who presented to [place] ED w/ LLE pain and SOB. Pt reports she underwent abd surg 4w ago. She was immobile for 1w then slowly mobilized. She had her abd drain removed 3d ago. Over the weekend she reported 2d of LLE swelling and pain and then began feeling mild dyspnea last night, notable w/ exertion as well as faint palpitations, feeling like her heart was racing. She then went to [place] ED for further eval. In their ED, pt was afebrile w/ HRs in the 110-130s, BP in the 130s/80s, satting 94% on RA. Labs showed WBC 13.7, normal Hgb and Plts, INR 1.12, D-Dimer 6497, normal Cr 0.8, HS-Trop I of 239.5, Pro-BNP 5130. CTPA showed extensive acute b/l PEs within the L and R main PA and multiple b/l lower order branches w/ an abnormal RV/LV ratio of 1.6 c/w RV strain.. LE Dopplers showed DVT within the L common femoral and profunda femoral veins as well as the L popliteal and posterior tibial veins and superficial thrombus in the L gastrocnemius and small saphenous veins. She was started on anticoagulation w/ heparin bolus and continued on a heparin gtt and a PERT call was initiated w/ ultimate plan for transfer to [facility] ED for further evaluation. 53 yo F w/ hx of HTN and recent abdominal surgery 4w ago who presented to [facility] ED w/ LLE pain and SOB. Pt reports she underwent abd surg 4w ago. She was immobile for 1w then slowly mobilized. She had her abd drain removed 3d ago. Over the weekend she reported 2d of LLE swelling and pain and then began feeling mild dyspnea last night, notable w/ exertion as well as faint palpitations, feeling like her heart was racing. She then went to [facility] ED for further eval. In their ED, pt was afebrile w/ HRs in the 110-130s, BP in the 130s/80s, satting 94% on RA. Labs showed WBC 13.7, normal Hgb and Plts, INR 1.12, D-Dimer 6497, normal Cr 0.8, HS-Trop I of 239.5, Pro-BNP 5130. CTPA showed extensive acute b/l PEs within the L and R main PA and multiple b/l lower order branches w/ an abnormal RV/LV ratio of 1.6 c/w RV strain.. LE Dopplers showed DVT within the L common femoral and profunda femoral veins as well as the L popliteal and posterior tibial veins and superficial thrombus in the L gastrocnemius and small saphenous veins. She was started on anticoagulation w/ heparin bolus and continued on a heparin gtt and a PERT call was initiated w/ ultimate plan for transfer to [facility] ED for further evaluation. 53 yo F w/ hx of HTN and recent abdominal surgery 4w ago who presented to [place] ED w/ LLE pain and SOB. Pt reports she underwent abd surg 4w ago. She was immobile for 1w then slowly mobilized. She had her abd drain removed 3d ago. Over the weekend she reported 2d of LLE swelling and pain and then began feeling mild dyspnea last night, notable w/ exertion as well as faint palpitations, feeling like her heart was racing. She then went to [place] ED for further eval. In their ED, pt was afebrile w/ HRs in the 110-130s, BP in the 130s/80s, satting 94% on RA. Labs showed WBC 13.7, normal Hgb and Plts, INR 1.12, D-Dimer 6497, normal Cr 0.8, HS-Trop I of 239.5, Pro-BNP 5130. CTPA showed extensive acute b/l PEs within the L and R main PA and multiple b/l lower order branches w/ an abnormal RV/LV ratio of 1.6 c/w RV strain.. LE Dopplers showed DVT within the L common femoral and profunda femoral veins as well as the L popliteal and posterior tibial veins and superficial thrombus in the L gastrocnemius and small saphenous veins. She was started on anticoagulation w/ heparin bolus and continued on a heparin gtt and a PERT call was initiated w/ ultimate plan for transfer to [facility] ED for further evaluation.

## 2023-10-09 NOTE — ED ADULT NURSE NOTE - NSFALLUNIVINTERV_ED_ALL_ED
Bed/Stretcher in lowest position, wheels locked, appropriate side rails in place/Call bell, personal items and telephone in reach/Instruct patient to call for assistance before getting out of bed/chair/stretcher/Non-slip footwear applied when patient is off stretcher/[place] to call system/Physically safe environment - no spills, clutter or unnecessary equipment/Purposeful proactive rounding/Room/bathroom lighting operational, light cord in reach Bed/Stretcher in lowest position, wheels locked, appropriate side rails in place/Call bell, personal items and telephone in reach/Instruct patient to call for assistance before getting out of bed/chair/stretcher/Non-slip footwear applied when patient is off stretcher/[place] to call system/Physically safe environment - no spills, clutter or unnecessary equipment/Purposeful proactive rounding/Room/bathroom lighting operational, light cord in reach Bed/Stretcher in lowest position, wheels locked, appropriate side rails in place/Call bell, personal items and telephone in reach/Instruct patient to call for assistance before getting out of bed/chair/stretcher/Non-slip footwear applied when patient is off stretcher/[place] to call system/Physically safe environment - no spills, clutter or unnecessary equipment/Purposeful proactive rounding/Room/bathroom lighting operational, light cord in reach

## 2023-10-09 NOTE — ED PROVIDER NOTE - CARE PLAN
Principal Discharge DX:	Pulmonary embolism  Secondary Diagnosis:	Deep vein thrombosis (DVT) of lower extremity   1

## 2023-10-09 NOTE — ED PROVIDER NOTE - CLINICAL SUMMARY MEDICAL DECISION MAKING FREE TEXT BOX
52-year-old female complaining of shortness of breath worse with exertion, palpitations, left leg swelling, recent abdominal ST surgery, follow-up CT angio chest, ultrasound lower extremities, send CBC, CMP, cardiac enzymes, EKG, placed on cardiac monitor, pulse oximeter and reevaluate.

## 2023-10-10 DIAGNOSIS — I10 ESSENTIAL (PRIMARY) HYPERTENSION: ICD-10-CM

## 2023-10-10 DIAGNOSIS — I26.99 OTHER PULMONARY EMBOLISM WITHOUT ACUTE COR PULMONALE: ICD-10-CM

## 2023-10-10 DIAGNOSIS — I80.10 PHLEBITIS AND THROMBOPHLEBITIS OF UNSPECIFIED FEMORAL VEIN: ICD-10-CM

## 2023-10-10 DIAGNOSIS — I82.409 ACUTE EMBOLISM AND THROMBOSIS OF UNSPECIFIED DEEP VEINS OF UNSPECIFIED LOWER EXTREMITY: ICD-10-CM

## 2023-10-10 LAB
ANION GAP SERPL CALC-SCNC: 13 MMOL/L — SIGNIFICANT CHANGE UP (ref 5–17)
APTT BLD: 29 SEC — SIGNIFICANT CHANGE UP (ref 24.5–35.6)
APTT BLD: 53.1 SEC — HIGH (ref 24.5–35.6)
APTT BLD: 66.2 SEC — HIGH (ref 24.5–35.6)
APTT BLD: 66.2 SEC — HIGH (ref 24.5–35.6)
APTT BLD: 83.1 SEC — HIGH (ref 24.5–35.6)
BUN SERPL-MCNC: 10 MG/DL — SIGNIFICANT CHANGE UP (ref 7–23)
CALCIUM SERPL-MCNC: 8.8 MG/DL — SIGNIFICANT CHANGE UP (ref 8.4–10.5)
CHLORIDE SERPL-SCNC: 106 MMOL/L — SIGNIFICANT CHANGE UP (ref 96–108)
CK MB CFR SERPL CALC: 3.5 NG/ML — SIGNIFICANT CHANGE UP (ref 0–3.8)
CK SERPL-CCNC: 50 U/L — SIGNIFICANT CHANGE UP (ref 25–170)
CO2 SERPL-SCNC: 22 MMOL/L — SIGNIFICANT CHANGE UP (ref 22–31)
CREAT SERPL-MCNC: 0.61 MG/DL — SIGNIFICANT CHANGE UP (ref 0.5–1.3)
EGFR: 109 ML/MIN/1.73M2 — SIGNIFICANT CHANGE UP
GLUCOSE SERPL-MCNC: 123 MG/DL — HIGH (ref 70–99)
HCT VFR BLD CALC: 33.7 % — LOW (ref 34.5–45)
HCT VFR BLD CALC: 35.7 % — SIGNIFICANT CHANGE UP (ref 34.5–45)
HGB BLD-MCNC: 10.9 G/DL — LOW (ref 11.5–15.5)
HGB BLD-MCNC: 11.5 G/DL — SIGNIFICANT CHANGE UP (ref 11.5–15.5)
MCHC RBC-ENTMCNC: 29.7 PG — SIGNIFICANT CHANGE UP (ref 27–34)
MCHC RBC-ENTMCNC: 29.8 PG — SIGNIFICANT CHANGE UP (ref 27–34)
MCHC RBC-ENTMCNC: 32.2 GM/DL — SIGNIFICANT CHANGE UP (ref 32–36)
MCHC RBC-ENTMCNC: 32.3 GM/DL — SIGNIFICANT CHANGE UP (ref 32–36)
MCV RBC AUTO: 92.1 FL — SIGNIFICANT CHANGE UP (ref 80–100)
MCV RBC AUTO: 92.2 FL — SIGNIFICANT CHANGE UP (ref 80–100)
NRBC # BLD: 0 /100 WBCS — SIGNIFICANT CHANGE UP (ref 0–0)
NT-PROBNP SERPL-SCNC: 2755 PG/ML — HIGH (ref 0–300)
PLATELET # BLD AUTO: 188 K/UL — SIGNIFICANT CHANGE UP (ref 150–400)
PLATELET # BLD AUTO: 191 K/UL — SIGNIFICANT CHANGE UP (ref 150–400)
POTASSIUM SERPL-MCNC: 3.9 MMOL/L — SIGNIFICANT CHANGE UP (ref 3.5–5.3)
POTASSIUM SERPL-SCNC: 3.9 MMOL/L — SIGNIFICANT CHANGE UP (ref 3.5–5.3)
RBC # BLD: 3.66 M/UL — LOW (ref 3.8–5.2)
RBC # BLD: 3.87 M/UL — SIGNIFICANT CHANGE UP (ref 3.8–5.2)
RBC # FLD: 14 % — SIGNIFICANT CHANGE UP (ref 10.3–14.5)
SODIUM SERPL-SCNC: 141 MMOL/L — SIGNIFICANT CHANGE UP (ref 135–145)
TROPONIN T, HIGH SENSITIVITY RESULT: 63 NG/L — HIGH (ref 0–51)
TROPONIN T, HIGH SENSITIVITY RESULT: 72 NG/L — HIGH (ref 0–51)
WBC # BLD: 11.71 K/UL — HIGH (ref 3.8–10.5)
WBC # BLD: 9.91 K/UL — SIGNIFICANT CHANGE UP (ref 3.8–10.5)
WBC # FLD AUTO: 11.71 K/UL — HIGH (ref 3.8–10.5)
WBC # FLD AUTO: 9.91 K/UL — SIGNIFICANT CHANGE UP (ref 3.8–10.5)

## 2023-10-10 PROCEDURE — 99233 SBSQ HOSP IP/OBS HIGH 50: CPT

## 2023-10-10 PROCEDURE — 37185 PRIM ART M-THRMBC SBSQ VSL: CPT

## 2023-10-10 PROCEDURE — 75743 ARTERY X-RAYS LUNGS: CPT | Mod: 26,59

## 2023-10-10 PROCEDURE — 36015 PLACE CATHETER IN ARTERY: CPT

## 2023-10-10 PROCEDURE — 93010 ELECTROCARDIOGRAM REPORT: CPT

## 2023-10-10 PROCEDURE — 99152 MOD SED SAME PHYS/QHP 5/>YRS: CPT | Mod: 59

## 2023-10-10 PROCEDURE — 37184 PRIM ART M-THRMBC 1ST VSL: CPT | Mod: 50,59

## 2023-10-10 RX ORDER — ACETAMINOPHEN 500 MG
650 TABLET ORAL EVERY 6 HOURS
Refills: 0 | Status: DISCONTINUED | OUTPATIENT
Start: 2023-10-10 | End: 2023-10-11

## 2023-10-10 RX ORDER — LOSARTAN POTASSIUM 100 MG/1
100 TABLET, FILM COATED ORAL DAILY
Refills: 0 | Status: DISCONTINUED | OUTPATIENT
Start: 2023-10-10 | End: 2023-10-11

## 2023-10-10 RX ORDER — CHOLECALCIFEROL (VITAMIN D3) 125 MCG
1000 CAPSULE ORAL DAILY
Refills: 0 | Status: DISCONTINUED | OUTPATIENT
Start: 2023-10-10 | End: 2023-10-11

## 2023-10-10 RX ORDER — HEPARIN SODIUM 5000 [USP'U]/ML
INJECTION INTRAVENOUS; SUBCUTANEOUS
Qty: 25000 | Refills: 0 | Status: DISCONTINUED | OUTPATIENT
Start: 2023-10-10 | End: 2023-10-11

## 2023-10-10 RX ORDER — OXYCODONE HYDROCHLORIDE 5 MG/1
5 TABLET ORAL ONCE
Refills: 0 | Status: DISCONTINUED | OUTPATIENT
Start: 2023-10-10 | End: 2023-10-10

## 2023-10-10 RX ORDER — ASCORBIC ACID 60 MG
500 TABLET,CHEWABLE ORAL DAILY
Refills: 0 | Status: DISCONTINUED | OUTPATIENT
Start: 2023-10-10 | End: 2023-10-11

## 2023-10-10 RX ORDER — ACETAMINOPHEN 500 MG
1000 TABLET ORAL ONCE
Refills: 0 | Status: COMPLETED | OUTPATIENT
Start: 2023-10-10 | End: 2023-10-10

## 2023-10-10 RX ADMIN — Medication 1000 MILLIGRAM(S): at 16:05

## 2023-10-10 RX ADMIN — HEPARIN SODIUM 2100 UNIT(S)/HR: 5000 INJECTION INTRAVENOUS; SUBCUTANEOUS at 03:04

## 2023-10-10 RX ADMIN — HEPARIN SODIUM 4000 UNIT(S): 5000 INJECTION INTRAVENOUS; SUBCUTANEOUS at 00:53

## 2023-10-10 RX ADMIN — OXYCODONE HYDROCHLORIDE 5 MILLIGRAM(S): 5 TABLET ORAL at 18:22

## 2023-10-10 RX ADMIN — HEPARIN SODIUM 2100 UNIT(S)/HR: 5000 INJECTION INTRAVENOUS; SUBCUTANEOUS at 00:50

## 2023-10-10 RX ADMIN — Medication 400 MILLIGRAM(S): at 15:40

## 2023-10-10 RX ADMIN — HEPARIN SODIUM 1800 UNIT(S)/HR: 5000 INJECTION INTRAVENOUS; SUBCUTANEOUS at 23:51

## 2023-10-10 RX ADMIN — HEPARIN SODIUM 2100 UNIT(S)/HR: 5000 INJECTION INTRAVENOUS; SUBCUTANEOUS at 15:31

## 2023-10-10 NOTE — PROGRESS NOTE ADULT - ASSESSMENT
51 yo F w/ hx of HTN and recent abdominal surgery 4w ago who presented to [facility] w/ LLE pain and SOB. Pt reports she underwent abd surg 4w ago. She was immobile for 1w then slowly mobilized. She had her abd drain removed 3d ago. Over the weekend she reported 2d of LLE swelling and pain and then began feeling mild dyspnea last night, notable w/ exertion as well as faint palpitations, feeling like her heart was racing. She then went to [facility] for further eval. In their ED, pt was afebrile w/ HRs in the 110-130s, BP in the 130s/80s, satting 94% on RA. Labs showed WBC 13.7, normal Hgb and Plts, INR 1.12, D-Dimer 6497, normal Cr 0.8, HS-Trop I of 239.5, Pro-BNP 5130. CTPA showed extensive acute b/l PEs within the L and R main PA and multiple b/l lower order branches w/ an abnormal RV/LV ratio of 1.6 c/w RV strain.. LE Dopplers showed DVT within the L common femoral and profunda femoral veins as well as the L popliteal and posterior tibial veins and superficial thrombus in the L gastrocnemius and small saphenous veins. She was started on anticoagulation w/ heparin bolus and continued on a heparin gtt and a PERT call was initiated w/ ultimate plan for transfer to [facility] ED for further evaluation.       Problem/Plan - 1:  ·  Problem: Bilateral pulmonary embolism.   ·  Plan: Oxygenating well on RA.   Vascular help appreciated.   Heparin started.    < from: TTE W or WO Ultrasound Enhancing Agent (10.09.23 @ 17:35) >  CONCLUSIONS:      1. Left ventricular wall thickness is normal. Left ventricular systolic function is hyperdynamic with an ejection fraction visually estimated at 70 to 75 %.   2. Normal filling pressure.   3. Severely enlarged right ventricular cavity size and wall thickness.   4. No pericardial effusion seen.   5. Mild tricuspid regurgitation.   6. Estimated pulmonary artery systolic pressure is 52 mmHg.   7. Findings were discussed with Cardiology fellow on 10/9/2023 at 7.40 pm. No prior echocardiogram is available for comparison.   8. Right ventricular systolic function is reduced with apical sparing consistent with acute pulmonary embolism (Brown's sign).   9. Technically difficult image quality.  10. There is no evidence of a left ventricular thrombus.    < end of copied text >   Problem/Plan - 2:  ·  Problem: Acute deep vein thrombosis (DVT).   ·  Plan: ON AC .  S/P Mechanical Thrombectomy per vascular.     Problem/Plan - 3:  ·  Problem: HTN (hypertension).   ·  Plan: BP meds with hold parameters.     53 yo F w/ hx of HTN and recent abdominal surgery 4w ago who presented to [facility] w/ LLE pain and SOB. Pt reports she underwent abd surg 4w ago. She was immobile for 1w then slowly mobilized. She had her abd drain removed 3d ago. Over the weekend she reported 2d of LLE swelling and pain and then began feeling mild dyspnea last night, notable w/ exertion as well as faint palpitations, feeling like her heart was racing. She then went to [facility] for further eval. In their ED, pt was afebrile w/ HRs in the 110-130s, BP in the 130s/80s, satting 94% on RA. Labs showed WBC 13.7, normal Hgb and Plts, INR 1.12, D-Dimer 6497, normal Cr 0.8, HS-Trop I of 239.5, Pro-BNP 5130. CTPA showed extensive acute b/l PEs within the L and R main PA and multiple b/l lower order branches w/ an abnormal RV/LV ratio of 1.6 c/w RV strain.. LE Dopplers showed DVT within the L common femoral and profunda femoral veins as well as the L popliteal and posterior tibial veins and superficial thrombus in the L gastrocnemius and small saphenous veins. She was started on anticoagulation w/ heparin bolus and continued on a heparin gtt and a PERT call was initiated w/ ultimate plan for transfer to [facility] ED for further evaluation.       Problem/Plan - 1:  ·  Problem: Bilateral pulmonary embolism.   ·  Plan: Oxygenating well on RA.   Vascular help appreciated.   Heparin started.    < from: TTE W or WO Ultrasound Enhancing Agent (10.09.23 @ 17:35) >  CONCLUSIONS:      1. Left ventricular wall thickness is normal. Left ventricular systolic function is hyperdynamic with an ejection fraction visually estimated at 70 to 75 %.   2. Normal filling pressure.   3. Severely enlarged right ventricular cavity size and wall thickness.   4. No pericardial effusion seen.   5. Mild tricuspid regurgitation.   6. Estimated pulmonary artery systolic pressure is 52 mmHg.   7. Findings were discussed with Cardiology fellow on 10/9/2023 at 7.40 pm. No prior echocardiogram is available for comparison.   8. Right ventricular systolic function is reduced with apical sparing consistent with acute pulmonary embolism (Brown's sign).   9. Technically difficult image quality.  10. There is no evidence of a left ventricular thrombus.    < end of copied text >   Problem/Plan - 2:  ·  Problem: Acute deep vein thrombosis (DVT).   ·  Plan: ON AC .  S/P Mechanical Thrombectomy per vascular.     Problem/Plan - 3:  ·  Problem: HTN (hypertension).   ·  Plan: BP meds with hold parameters.     53 yo F w/ hx of HTN and recent abdominal surgery 4w ago who presented to [place] ED w/ LLE pain and SOB. Pt reports she underwent abd surg 4w ago. She was immobile for 1w then slowly mobilized. She had her abd drain removed 3d ago. Over the weekend she reported 2d of LLE swelling and pain and then began feeling mild dyspnea last night, notable w/ exertion as well as faint palpitations, feeling like her heart was racing. She then went to [place] ED for further eval. In their ED, pt was afebrile w/ HRs in the 110-130s, BP in the 130s/80s, satting 94% on RA. Labs showed WBC 13.7, normal Hgb and Plts, INR 1.12, D-Dimer 6497, normal Cr 0.8, HS-Trop I of 239.5, Pro-BNP 5130. CTPA showed extensive acute b/l PEs within the L and R main PA and multiple b/l lower order branches w/ an abnormal RV/LV ratio of 1.6 c/w RV strain.. LE Dopplers showed DVT within the L common femoral and profunda femoral veins as well as the L popliteal and posterior tibial veins and superficial thrombus in the L gastrocnemius and small saphenous veins. She was started on anticoagulation w/ heparin bolus and continued on a heparin gtt and a PERT call was initiated w/ ultimate plan for transfer to [facility] ED for further evaluation.       Problem/Plan - 1:  ·  Problem: Bilateral pulmonary embolism.   ·  Plan: Oxygenating well on RA.   Vascular help appreciated.   Heparin started.    < from: TTE W or WO Ultrasound Enhancing Agent (10.09.23 @ 17:35) >  CONCLUSIONS:      1. Left ventricular wall thickness is normal. Left ventricular systolic function is hyperdynamic with an ejection fraction visually estimated at 70 to 75 %.   2. Normal filling pressure.   3. Severely enlarged right ventricular cavity size and wall thickness.   4. No pericardial effusion seen.   5. Mild tricuspid regurgitation.   6. Estimated pulmonary artery systolic pressure is 52 mmHg.   7. Findings were discussed with Cardiology fellow on 10/9/2023 at 7.40 pm. No prior echocardiogram is available for comparison.   8. Right ventricular systolic function is reduced with apical sparing consistent with acute pulmonary embolism (Brown's sign).   9. Technically difficult image quality.  10. There is no evidence of a left ventricular thrombus.    < end of copied text >   Problem/Plan - 2:  ·  Problem: Acute deep vein thrombosis (DVT).   ·  Plan: ON AC .  S/P Mechanical Thrombectomy per vascular.     Problem/Plan - 3:  ·  Problem: HTN (hypertension).   ·  Plan: BP meds with hold parameters.

## 2023-10-10 NOTE — PROGRESS NOTE ADULT - ASSESSMENT
51 yo F w/ HTN and recent abd surgery 4w ago presenting w/ 2-3d of LLE swelling and 1d of SOB/palpitations found to be mildly tachycardic w/ normal BP and SpO2 and b/l main pulmonary artery PEs and LLE DVTs on imaging w/ elevated pro-BNP and trop c/w intermediate-high risk PE for which the PERT team was consulted.    1. B/l PEs - Initially mildly tachy to 110-130s, normal BP and normal SpO2 on RA. CTPA w/ RV dilation c/f RV strain. Trop and pro-BNP mildly elevated. Overall picture c/w intermediate high risk PE.  2. LLE DVT - pt found to have LLE DVT within the common femoral, profunda femoral, popliteal and PT arteries. Also w/ superficial DVT within the L gastrocneumis and small saphenous vein. On AC.  3. HTN    Recommendation:  - plan for mechanical thrombectomy today  - Continue AC w/ heparin gtt with therapeutic pTT   - Please trend trop until peak  - Monitor on telemetry    Note not final until signed by attending       Basim Vincent MD  Cardiology Fellow PGY-6  Phone: [phone]    For all New Consults  [email or web address]   Login: [employer] 51 yo F w/ HTN and recent abd surgery 4w ago presenting w/ 2-3d of LLE swelling and 1d of SOB/palpitations found to be mildly tachycardic w/ normal BP and SpO2 and b/l main pulmonary artery PEs and LLE DVTs on imaging w/ elevated pro-BNP and trop c/w intermediate-high risk PE for which the PERT team was consulted.    1. B/l PEs - Initially mildly tachy to 110-130s, normal BP and normal SpO2 on RA. CTPA w/ RV dilation c/f RV strain. Trop and pro-BNP mildly elevated. Overall picture c/w intermediate high risk PE.  2. LLE DVT - pt found to have LLE DVT within the common femoral, profunda femoral, popliteal and PT arteries. Also w/ superficial DVT within the L gastrocneumis and small saphenous vein. On AC.  3. HTN    Recommendation:  - plan for mechanical thrombectomy today  - Continue AC w/ heparin gtt with therapeutic pTT   - Please trend trop until peak  - Monitor on telemetry    Note not final until signed by attending       Basim Vincent MD  Cardiology Fellow PGY-6  Phone: [phone]    For all New Consults  [email or web address]   Login: [employer] 51 yo F w/ HTN and recent abd surgery 4w ago presenting w/ 2-3d of LLE swelling and 1d of SOB/palpitations found to be mildly tachycardic w/ normal BP and SpO2 and b/l main pulmonary artery PEs and LLE DVTs on imaging w/ elevated pro-BNP and trop c/w intermediate-high risk PE for which the PERT team was consulted.    1. B/l PEs - Initially mildly tachy to 110-130s, normal BP and normal SpO2 on RA. CTPA w/ RV dilation c/f RV strain. Trop and pro-BNP mildly elevated. Overall picture c/w intermediate high risk PE.  2. LLE DVT - pt found to have LLE DVT within the common femoral, profunda femoral, popliteal and PT arteries. Also w/ superficial DVT within the L gastrocneumis and small saphenous vein. On AC.  3. HTN    Recommendation:  - plan for mechanical thrombectomy today  - Continue AC w/ heparin gtt with therapeutic pTT   - Please trend trop until peak  - Monitor on telemetry    Note not final until signed by attending       Basim Vincent MD  Cardiology Fellow PGY-6  Phone: [phone]    For all New Consults  [email or web address]   Login: [employer]

## 2023-10-10 NOTE — PROGRESS NOTE ADULT - SUBJECTIVE AND OBJECTIVE BOX
Patient seen and examined at bedside.    Overnight Events:     REVIEW OF SYSTEMS:  General: no fatigue/malaise, weight loss/gain.  Skin: no rashes.  Ophthalmologic: no blurred vision, no loss of vision. 	  ENT: no sore throat, rhinorrhea, sinus congestion.  Respiratory: no SOB, cough or wheeze.  CV: No chest pain. No palpitations.   Gastrointestinal:  no N/V/D, no melena/hematemesis/hematochezia.  Genitourinary: no dysuria/hesitancy or hematuria.  Musculoskeletal: no myalgias or arthralgias.  Neurological: no changes in vision or hearing, no lightheadedness/dizziness, no syncope/near syncope	  Psychiatric: no unusual stress/anxiety.   Hematology/Lymphatics: no unusual bleeding, bruising and no lymphadenopathy.  Endocrine: no unusual thirst.           Current Meds:  heparin   Injectable 4000 Unit(s) IV Push every 6 hours PRN  heparin   Injectable 9000 Unit(s) IV Push every 6 hours PRN  heparin  Infusion.  Unit(s)/Hr IV Continuous <Continuous>  loratadine 10 milliGRAM(s) Oral once      Vitals:  T(F): 98 (10-10), Max: 98.5 (10-09)  HR: 102 (10-10) (102 - 118)  BP: 126/86 (10-10) (126/86 - 142/96)  RR: 18 (10-10)  SpO2: 96% (10-10)  I&O's Summary      Physical Exam:  Appearance: No acute distress; well appearing  Eyes: PERRL, EOMI, pink conjunctiva  HEENT: Normal oral mucosa  Cardiovascular: RRR, S1, S2, no murmurs, rubs, or gallops; no edema; no JVD  Respiratory: Clear to auscultation bilaterally  Gastrointestinal: soft, non-tender, non-distended with normal bowel sounds  Musculoskeletal: No clubbing; no joint deformity   Neurologic: Non-focal  Lymphatic: No lymphadenopathy  Psychiatry: AAOx3, mood & affect appropriate  Skin: No rashes, ecchymoses, or cyanosis                          10.9   9.91  )-----------( 188      ( 10 Oct 2023 06:49 )             33.7     10-10    141  |  106  |  10  ----------------------------<  123<H>  3.9   |  22  |  0.61    Ca    8.8      10 Oct 2023 06:49    TPro  6.9  /  Alb  3.2<L>  /  TBili  0.6  /  DBili  x   /  AST  46<H>  /  ALT  26  /  AlkPhos  78  10-09    PT/INR - ( 09 Oct 2023 17:10 )   PT: 13.1 sec;   INR: 1.20 ratio         PTT - ( 10 Oct 2023 06:49 )  PTT:83.1 sec  CARDIAC MARKERS ( 10 Oct 2023 06:49 )  63 ng/L / x     / x     / x     / x     / x      CARDIAC MARKERS ( 09 Oct 2023 23:52 )  72 ng/L / x     / x     / 50 U/L / x     / 3.5 ng/mL  CARDIAC MARKERS ( 09 Oct 2023 17:10 )  74 ng/L / x     / x     / x     / x     / x                    TRANSTHORACIC ECHOCARDIOGRAM REPORT  ________________________________________________________________________________                                      _______       Pt. Name:       SHIRLENE BERRY Study Date:    10/9/2023  MRN:            [id number]         YOB: 1970  Accession #:    [id number]          Age:           52 years  Account#:       [id number]       Gender:        F  Heart Rate:     115 bpm            Height:        65.00 in (165.10 cm)  Rhythm:         sinus tachycardia  Weight:        236.00 lb (107.05 kg)  Blood Pressure: 137/81 mmHg        BSA/BMI:       2.12 m² / 39.27 kg/m²  ________________________________________________________________________________________  Referring Physician:    CAROLINA MOSLEY  Interpreting Physician: Daren Apple M.D.  Primary Sonographer:    Brent Sevilla [facility]  Fellow (Performing):    Fortunato Padgett MD    CPT:               ECHO TTE WO CON COMP W DOPP - 80565.m  Indication(s):     Shortness of breath - R06.02  Procedure:         Transthoracic echocardiogram with 2-D, M-mode and complete                     spectral and color flow Doppler.  Ordering Location: [facility]  Admission Status:  Inpatient  Study Information: Image quality for this study is technically difficult.    _______________________________________________________________________________________     CONCLUSIONS:      1. Left ventricular wall thickness is normal. Left ventricular systolic function is hyperdynamic with an ejection fraction visually estimated at 70 to 75 %.   2. Normal filling pressure.   3. Severely enlarged right ventricular cavity size and wall thickness.   4. No pericardial effusion seen.   5. Mild tricuspid regurgitation.   6. Estimated pulmonary artery systolic pressure is 52 mmHg.   7. Findings were discussed with Cardiology fellow on 10/9/2023 at 7.40 pm. No prior echocardiogram is available for comparison.   8. Right ventricular systolic function is reduced with apical sparing consistent with acute pulmonary embolism (Brown's sign).   9. Technically difficult image quality.  10. There is no evidence of a left ventricular thrombus.    ________________________________________________________________________________________   Patient seen and examined at bedside.    Overnight Events:     REVIEW OF SYSTEMS:  General: no fatigue/malaise, weight loss/gain.  Skin: no rashes.  Ophthalmologic: no blurred vision, no loss of vision. 	  ENT: no sore throat, rhinorrhea, sinus congestion.  Respiratory: no SOB, cough or wheeze.  CV: No chest pain. No palpitations.   Gastrointestinal:  no N/V/D, no melena/hematemesis/hematochezia.  Genitourinary: no dysuria/hesitancy or hematuria.  Musculoskeletal: no myalgias or arthralgias.  Neurological: no changes in vision or hearing, no lightheadedness/dizziness, no syncope/near syncope	  Psychiatric: no unusual stress/anxiety.   Hematology/Lymphatics: no unusual bleeding, bruising and no lymphadenopathy.  Endocrine: no unusual thirst.           Current Meds:  heparin   Injectable 4000 Unit(s) IV Push every 6 hours PRN  heparin   Injectable 9000 Unit(s) IV Push every 6 hours PRN  heparin  Infusion.  Unit(s)/Hr IV Continuous <Continuous>  loratadine 10 milliGRAM(s) Oral once      Vitals:  T(F): 98 (10-10), Max: 98.5 (10-09)  HR: 102 (10-10) (102 - 118)  BP: 126/86 (10-10) (126/86 - 142/96)  RR: 18 (10-10)  SpO2: 96% (10-10)  I&O's Summary      Physical Exam:  Appearance: No acute distress; well appearing  Eyes: PERRL, EOMI, pink conjunctiva  HEENT: Normal oral mucosa  Cardiovascular: RRR, S1, S2, no murmurs, rubs, or gallops; no edema; no JVD  Respiratory: Clear to auscultation bilaterally  Gastrointestinal: soft, non-tender, non-distended with normal bowel sounds  Musculoskeletal: No clubbing; no joint deformity   Neurologic: Non-focal  Lymphatic: No lymphadenopathy  Psychiatry: AAOx3, mood & affect appropriate  Skin: No rashes, ecchymoses, or cyanosis                          10.9   9.91  )-----------( 188      ( 10 Oct 2023 06:49 )             33.7     10-10    141  |  106  |  10  ----------------------------<  123<H>  3.9   |  22  |  0.61    Ca    8.8      10 Oct 2023 06:49    TPro  6.9  /  Alb  3.2<L>  /  TBili  0.6  /  DBili  x   /  AST  46<H>  /  ALT  26  /  AlkPhos  78  10-09    PT/INR - ( 09 Oct 2023 17:10 )   PT: 13.1 sec;   INR: 1.20 ratio         PTT - ( 10 Oct 2023 06:49 )  PTT:83.1 sec  CARDIAC MARKERS ( 10 Oct 2023 06:49 )  63 ng/L / x     / x     / x     / x     / x      CARDIAC MARKERS ( 09 Oct 2023 23:52 )  72 ng/L / x     / x     / 50 U/L / x     / 3.5 ng/mL  CARDIAC MARKERS ( 09 Oct 2023 17:10 )  74 ng/L / x     / x     / x     / x     / x                    TRANSTHORACIC ECHOCARDIOGRAM REPORT  ________________________________________________________________________________                                      _______       Pt. Name:       SHIRLENE BERRY Study Date:    10/9/2023  MRN:            [id number]         YOB: 1970  Accession #:    [id number]          Age:           52 years  Account#:       [id number]       Gender:        F  Heart Rate:     115 bpm            Height:        65.00 in (165.10 cm)  Rhythm:         sinus tachycardia  Weight:        236.00 lb (107.05 kg)  Blood Pressure: 137/81 mmHg        BSA/BMI:       2.12 m² / 39.27 kg/m²  ________________________________________________________________________________________  Referring Physician:    CAROLINA MOSLEY  Interpreting Physician: Daren Apple M.D.  Primary Sonographer:    Brent Sevilla [facility]  Fellow (Performing):    Fortunato Padgett MD    CPT:               ECHO TTE WO CON COMP W DOPP - 27833.m  Indication(s):     Shortness of breath - R06.02  Procedure:         Transthoracic echocardiogram with 2-D, M-mode and complete                     spectral and color flow Doppler.  Ordering Location: [facility]  Admission Status:  Inpatient  Study Information: Image quality for this study is technically difficult.    _______________________________________________________________________________________     CONCLUSIONS:      1. Left ventricular wall thickness is normal. Left ventricular systolic function is hyperdynamic with an ejection fraction visually estimated at 70 to 75 %.   2. Normal filling pressure.   3. Severely enlarged right ventricular cavity size and wall thickness.   4. No pericardial effusion seen.   5. Mild tricuspid regurgitation.   6. Estimated pulmonary artery systolic pressure is 52 mmHg.   7. Findings were discussed with Cardiology fellow on 10/9/2023 at 7.40 pm. No prior echocardiogram is available for comparison.   8. Right ventricular systolic function is reduced with apical sparing consistent with acute pulmonary embolism (Brown's sign).   9. Technically difficult image quality.  10. There is no evidence of a left ventricular thrombus.    ________________________________________________________________________________________   Patient seen and examined at bedside.    Overnight Events:     REVIEW OF SYSTEMS:  General: no fatigue/malaise, weight loss/gain.  Skin: no rashes.  Ophthalmologic: no blurred vision, no loss of vision. 	  ENT: no sore throat, rhinorrhea, sinus congestion.  Respiratory: no SOB, cough or wheeze.  CV: No chest pain. No palpitations.   Gastrointestinal:  no N/V/D, no melena/hematemesis/hematochezia.  Genitourinary: no dysuria/hesitancy or hematuria.  Musculoskeletal: no myalgias or arthralgias.  Neurological: no changes in vision or hearing, no lightheadedness/dizziness, no syncope/near syncope	  Psychiatric: no unusual stress/anxiety.   Hematology/Lymphatics: no unusual bleeding, bruising and no lymphadenopathy.  Endocrine: no unusual thirst.           Current Meds:  heparin   Injectable 4000 Unit(s) IV Push every 6 hours PRN  heparin   Injectable 9000 Unit(s) IV Push every 6 hours PRN  heparin  Infusion.  Unit(s)/Hr IV Continuous <Continuous>  loratadine 10 milliGRAM(s) Oral once      Vitals:  T(F): 98 (10-10), Max: 98.5 (10-09)  HR: 102 (10-10) (102 - 118)  BP: 126/86 (10-10) (126/86 - 142/96)  RR: 18 (10-10)  SpO2: 96% (10-10)  I&O's Summary      Physical Exam:  Appearance: No acute distress; well appearing  Eyes: PERRL, EOMI, pink conjunctiva  HEENT: Normal oral mucosa  Cardiovascular: RRR, S1, S2, no murmurs, rubs, or gallops; no edema; no JVD  Respiratory: Clear to auscultation bilaterally  Gastrointestinal: soft, non-tender, non-distended with normal bowel sounds  Musculoskeletal: No clubbing; no joint deformity   Neurologic: Non-focal  Lymphatic: No lymphadenopathy  Psychiatry: AAOx3, mood & affect appropriate  Skin: No rashes, ecchymoses, or cyanosis                          10.9   9.91  )-----------( 188      ( 10 Oct 2023 06:49 )             33.7     10-10    141  |  106  |  10  ----------------------------<  123<H>  3.9   |  22  |  0.61    Ca    8.8      10 Oct 2023 06:49    TPro  6.9  /  Alb  3.2<L>  /  TBili  0.6  /  DBili  x   /  AST  46<H>  /  ALT  26  /  AlkPhos  78  10-09    PT/INR - ( 09 Oct 2023 17:10 )   PT: 13.1 sec;   INR: 1.20 ratio         PTT - ( 10 Oct 2023 06:49 )  PTT:83.1 sec  CARDIAC MARKERS ( 10 Oct 2023 06:49 )  63 ng/L / x     / x     / x     / x     / x      CARDIAC MARKERS ( 09 Oct 2023 23:52 )  72 ng/L / x     / x     / 50 U/L / x     / 3.5 ng/mL  CARDIAC MARKERS ( 09 Oct 2023 17:10 )  74 ng/L / x     / x     / x     / x     / x                    TRANSTHORACIC ECHOCARDIOGRAM REPORT  ________________________________________________________________________________                                      _______       Pt. Name:       SHIRLENE BERRY Study Date:    10/9/2023  MRN:            [id number]         YOB: 1970  Accession #:    [id number]          Age:           52 years  Account#:       [id number]       Gender:        F  Heart Rate:     115 bpm            Height:        65.00 in (165.10 cm)  Rhythm:         sinus tachycardia  Weight:        236.00 lb (107.05 kg)  Blood Pressure: 137/81 mmHg        BSA/BMI:       2.12 m² / 39.27 kg/m²  ________________________________________________________________________________________  Referring Physician:    CAROLINA MOSLEY  Interpreting Physician: Daren Apple M.D.  Primary Sonographer:    Brent Sevilla [facility]  Fellow (Performing):    Fortunato Padgtet MD    CPT:               ECHO TTE WO CON COMP W DOPP - 16086.m  Indication(s):     Shortness of breath - R06.02  Procedure:         Transthoracic echocardiogram with 2-D, M-mode and complete                     spectral and color flow Doppler.  Ordering Location: [facility]  Admission Status:  Inpatient  Study Information: Image quality for this study is technically difficult.    _______________________________________________________________________________________     CONCLUSIONS:      1. Left ventricular wall thickness is normal. Left ventricular systolic function is hyperdynamic with an ejection fraction visually estimated at 70 to 75 %.   2. Normal filling pressure.   3. Severely enlarged right ventricular cavity size and wall thickness.   4. No pericardial effusion seen.   5. Mild tricuspid regurgitation.   6. Estimated pulmonary artery systolic pressure is 52 mmHg.   7. Findings were discussed with Cardiology fellow on 10/9/2023 at 7.40 pm. No prior echocardiogram is available for comparison.   8. Right ventricular systolic function is reduced with apical sparing consistent with acute pulmonary embolism (Brown's sign).   9. Technically difficult image quality.  10. There is no evidence of a left ventricular thrombus.    ________________________________________________________________________________________

## 2023-10-10 NOTE — PATIENT PROFILE ADULT - FALL HARM RISK - UNIVERSAL INTERVENTIONS
Bed in lowest position, wheels locked, appropriate side rails in place/Call bell, personal items and telephone in reach/Instruct patient to call for assistance before getting out of bed or chair/Non-slip footwear when patient is out of bed/[place] to call system/Physically safe environment - no spills, clutter or unnecessary equipment/Purposeful Proactive Rounding/Room/bathroom lighting operational, light cord in reach Bed in lowest position, wheels locked, appropriate side rails in place/Call bell, personal items and telephone in reach/Instruct patient to call for assistance before getting out of bed or chair/Non-slip footwear when patient is out of bed/[place] to call system/Physically safe environment - no spills, clutter or unnecessary equipment/Purposeful Proactive Rounding/Room/bathroom lighting operational, light cord in reach Bed in lowest position, wheels locked, appropriate side rails in place/Call bell, personal items and telephone in reach/Instruct patient to call for assistance before getting out of bed or chair/Non-slip footwear when patient is out of bed/[place] to call system/Physically safe environment - no spills, clutter or unnecessary equipment/Purposeful Proactive Rounding/Room/bathroom lighting operational, light cord in reach

## 2023-10-10 NOTE — PROGRESS NOTE ADULT - SUBJECTIVE AND OBJECTIVE BOX
Date of Service  : 10-10-23    INTERVAL HPI/OVERNIGHT EVENTS: I feel 100% better.   Vital Signs Last 24 Hrs  T(C): 37 (10 Oct 2023 15:05), Max: 37.2 (10 Oct 2023 11:41)  T(F): 98.6 (10 Oct 2023 15:05), Max: 99 (10 Oct 2023 11:41)  HR: 95 (10 Oct 2023 15:15) (95 - 118)  BP: 131/103 (10 Oct 2023 15:15) (126/86 - 171/91)  BP(mean): 109 (10 Oct 2023 15:15) (107 - 109)  RR: 17 (10 Oct 2023 15:15) (16 - 20)  SpO2: 100% (10 Oct 2023 15:15) (93% - 100%)    Parameters below as of 10 Oct 2023 15:15  Patient On (Oxygen Delivery Method): room air      I&O's Summary    10 Oct 2023 07:01  -  10 Oct 2023 15:18  --------------------------------------------------------  IN: 180 mL / OUT: 0 mL / NET: 180 mL      MEDICATIONS  (STANDING):  heparin  Infusion.  Unit(s)/Hr (19 mL/Hr) IV Continuous <Continuous>  loratadine 10 milliGRAM(s) Oral once    MEDICATIONS  (PRN):  heparin   Injectable 4000 Unit(s) IV Push every 6 hours PRN For aPTT between 40 - 57  heparin   Injectable 9000 Unit(s) IV Push every 6 hours PRN For aPTT less than 40    LABS:                        10.9   9.91  )-----------( 188      ( 10 Oct 2023 06:49 )             33.7     10-10    141  |  106  |  10  ----------------------------<  123<H>  3.9   |  22  |  0.61    Ca    8.8      10 Oct 2023 06:49    TPro  6.9  /  Alb  3.2<L>  /  TBili  0.6  /  DBili  x   /  AST  46<H>  /  ALT  26  /  AlkPhos  78  10-09    PT/INR - ( 09 Oct 2023 17:10 )   PT: 13.1 sec;   INR: 1.20 ratio         PTT - ( 10 Oct 2023 06:49 )  PTT:83.1 sec  Urinalysis Basic - ( 10 Oct 2023 06:49 )    Color: x / Appearance: x / SG: x / pH: x  Gluc: 123 mg/dL / Ketone: x  / Bili: x / Urobili: x   Blood: x / Protein: x / Nitrite: x   Leuk Esterase: x / RBC: x / WBC x   Sq Epi: x / Non Sq Epi: x / Bacteria: x      CAPILLARY BLOOD GLUCOSE            Urinalysis Basic - ( 10 Oct 2023 06:49 )    Color: x / Appearance: x / SG: x / pH: x  Gluc: 123 mg/dL / Ketone: x  / Bili: x / Urobili: x   Blood: x / Protein: x / Nitrite: x   Leuk Esterase: x / RBC: x / WBC x   Sq Epi: x / Non Sq Epi: x / Bacteria: x      REVIEW OF SYSTEMS:  CONSTITUTIONAL: No fever, weight loss, or fatigue  EYES: No eye pain, visual disturbances, or discharge  ENMT:  No difficulty hearing, tinnitus, vertigo; No sinus or throat pain  NECK: No pain or stiffness  RESPIRATORY: No cough, wheezing, chills or hemoptysis; No shortness of breath  CARDIOVASCULAR: No chest pain, palpitations, dizziness, or leg swelling  GASTROINTESTINAL: No abdominal or epigastric pain. No nausea, vomiting, or hematemesis; No diarrhea or constipation. No melena or hematochezia.  GENITOURINARY: No dysuria, frequency, hematuria, or incontinence  NEUROLOGICAL: No headaches, memory loss, loss of strength, numbness, or tremors      Consultant(s) Notes Reviewed:  [x ] YES  [ ] NO    PHYSICAL EXAM:  GENERAL: NAD, well-groomed, well-developed,not in any distress ,  HEAD:  Atraumatic, Normocephalic  EYES: EOMI, PERRLA, conjunctiva and sclera clear  ENMT: No tonsillar erythema, exudates, or enlargement; Moist mucous membranes, Good dentition, No lesions  NECK: Supple, No JVD, Normal thyroid  NERVOUS SYSTEM:  Alert & Oriented X3, No focal deficit   CHEST/LUNG: Good air entry bilateral with no  rales, rhonchi, wheezing, or rubs  HEART: Regular rate and rhythm; No murmurs, rubs, or gallops  ABDOMEN: Soft, Nontender, Nondistended; Bowel sounds present  EXTREMITIES:  2+ Peripheral Pulses, No clubbing, cyanosis, or edema    Care Discussed with Consultants/Other Providers [ x] YES  [ ] NO

## 2023-10-10 NOTE — CHART NOTE - NSCHARTNOTEFT_GEN_A_CORE
Patient of Dr. Santiago - s/p abdominoplasty and liposuction last month. Initially presented to [facility] ED w/ CC LE pain and SOB found to be tachy 110-130s and sating 94% on RA. Labs notable for D-Dimer 6497, HS-Trop I of 239.5, Pro-BNP 5130. CTPA showed extensive acute b/l PEs within the L and R main PA and multiple b/l lower order branches w/ an abnormal RV/LV ratio of 1.6 c/w RV strain.. LE Dopplers showed DVT within the L common femoral and profunda femoral veins as well as the L popliteal and posterior tibial veins and superficial thrombus in the L gastrocnemius and small saphenous veins. She was started on anticoagulation w/ heparin bolus and continued on a heparin gtt. Patient transferred to [facility] where she underwent mechanical thrombectomy.    Subjective: Patient resting in bed. Patient feeling significantly improved s/p thrombectomy. Patient denies subjective fever/chills, CP, SOB, n/v, or abdominal pain.      Objective:  Vitals:  T(C): 37 (10-10-23 @ 15:05), Max: 37.2 (10-10-23 @ 11:41)  HR: 95 (10-10-23 @ 15:15) (95 - 118)  BP: 131/103 (10-10-23 @ 15:15) (105/70 - 171/91)  RR: 17 (10-10-23 @ 15:15) (16 - 18)  SpO2: 100% (10-10-23 @ 15:15) (94% - 100%)  Wt(kg): --    10-10 @ 07:01  -  10-10 @ 16:16  --------------------------------------------------------  IN:    Oral Fluid: 180 mL  Total IN: 180 mL    OUT:  Total OUT: 0 mL    Total NET: 180 mL      Physical Exam:  General Appearance: no acute distress, NTND   Chest: airway intact, non-labored breathing  CV: no JVD, palpable pulses b/l  Abdomen: soft, non-tender, non-distended, abdominal binder in place, previous surgical incisions c/d/i   Extremities: WWP       Labs:                        10.9   9.91  )-----------( 188      ( 10 Oct 2023 06:49 )             33.7     10-10    141  |  106  |  10  ----------------------------<  123<H>  3.9   |  22  |  0.61    Ca    8.8      10 Oct 2023 06:49    TPro  6.9  /  Alb  3.2<L>  /  TBili  0.6  /  DBili  x   /  AST  46<H>  /  ALT  26  /  AlkPhos  78  10-09    LIVER FUNCTIONS - ( 09 Oct 2023 17:10 )  Alb: 3.2 g/dL / Pro: 6.9 g/dL / ALK PHOS: 78 U/L / ALT: 26 U/L / AST: 46 U/L / GGT: x           PT/INR - ( 09 Oct 2023 17:10 )   PT: 13.1 sec;   INR: 1.20 ratio         PTT - ( 10 Oct 2023 06:49 )  PTT:83.1 sec  Urinalysis Basic - ( 10 Oct 2023 06:49 )    Color: x / Appearance: x / SG: x / pH: x  Gluc: 123 mg/dL / Ketone: x  / Bili: x / Urobili: x   Blood: x / Protein: x / Nitrite: x   Leuk Esterase: x / RBC: x / WBC x   Sq Epi: x / Non Sq Epi: x / Bacteria: x              A/P: 52yFemale  - Dr. Santiago aware of patient   - c/w hep gtt  - ambulate/OOBTC as tolerated  - plastics will follow while admitted  - care per primary team    Andrea Ireland PGY1 Patient of Dr. Santiago - s/p abdominoplasty and liposuction last month. Initially presented to [facility] ED w/ CC LE pain and SOB found to be tachy 110-130s and sating 94% on RA. Labs notable for D-Dimer 6497, HS-Trop I of 239.5, Pro-BNP 5130. CTPA showed extensive acute b/l PEs within the L and R main PA and multiple b/l lower order branches w/ an abnormal RV/LV ratio of 1.6 c/w RV strain.. LE Dopplers showed DVT within the L common femoral and profunda femoral veins as well as the L popliteal and posterior tibial veins and superficial thrombus in the L gastrocnemius and small saphenous veins. She was started on anticoagulation w/ heparin bolus and continued on a heparin gtt. Patient transferred to [facility] where she underwent mechanical thrombectomy.    Subjective: Patient resting in bed. Patient feeling significantly improved s/p thrombectomy. Patient denies subjective fever/chills, CP, SOB, n/v, or abdominal pain.      Objective:  Vitals:  T(C): 37 (10-10-23 @ 15:05), Max: 37.2 (10-10-23 @ 11:41)  HR: 95 (10-10-23 @ 15:15) (95 - 118)  BP: 131/103 (10-10-23 @ 15:15) (105/70 - 171/91)  RR: 17 (10-10-23 @ 15:15) (16 - 18)  SpO2: 100% (10-10-23 @ 15:15) (94% - 100%)  Wt(kg): --    10-10 @ 07:01  -  10-10 @ 16:16  --------------------------------------------------------  IN:    Oral Fluid: 180 mL  Total IN: 180 mL    OUT:  Total OUT: 0 mL    Total NET: 180 mL      Physical Exam:  General Appearance: no acute distress, NTND   Chest: airway intact, non-labored breathing  CV: no JVD, palpable pulses b/l  Abdomen: soft, non-tender, non-distended, abdominal binder in place, previous surgical incisions c/d/i   Extremities: WWP       Labs:                        10.9   9.91  )-----------( 188      ( 10 Oct 2023 06:49 )             33.7     10-10    141  |  106  |  10  ----------------------------<  123<H>  3.9   |  22  |  0.61    Ca    8.8      10 Oct 2023 06:49    TPro  6.9  /  Alb  3.2<L>  /  TBili  0.6  /  DBili  x   /  AST  46<H>  /  ALT  26  /  AlkPhos  78  10-09    LIVER FUNCTIONS - ( 09 Oct 2023 17:10 )  Alb: 3.2 g/dL / Pro: 6.9 g/dL / ALK PHOS: 78 U/L / ALT: 26 U/L / AST: 46 U/L / GGT: x           PT/INR - ( 09 Oct 2023 17:10 )   PT: 13.1 sec;   INR: 1.20 ratio         PTT - ( 10 Oct 2023 06:49 )  PTT:83.1 sec  Urinalysis Basic - ( 10 Oct 2023 06:49 )    Color: x / Appearance: x / SG: x / pH: x  Gluc: 123 mg/dL / Ketone: x  / Bili: x / Urobili: x   Blood: x / Protein: x / Nitrite: x   Leuk Esterase: x / RBC: x / WBC x   Sq Epi: x / Non Sq Epi: x / Bacteria: x              A/P: 52yFemale  - Dr. Santiago aware of patient   - c/w hep gtt  - ambulate/OOBTC as tolerated  - plastics will follow while admitted  - care per primary team    Andrea Ireland PGY1 Patient of Dr. Santiago - s/p abdominoplasty and liposuction last month. Initially presented to [facility] ED w/ CC LE pain and SOB found to be tachy 110-130s and sating 94% on RA. Labs notable for D-Dimer 6497, HS-Trop I of 239.5, Pro-BNP 5130. CTPA showed extensive acute b/l PEs within the L and R main PA and multiple b/l lower order branches w/ an abnormal RV/LV ratio of 1.6 c/w RV strain.. LE Dopplers showed DVT within the L common femoral and profunda femoral veins as well as the L popliteal and posterior tibial veins and superficial thrombus in the L gastrocnemius and small saphenous veins. She was started on anticoagulation w/ heparin bolus and continued on a heparin gtt. Patient transferred to [facility] where she underwent mechanical thrombectomy.    Subjective: Patient resting in bed. Patient feeling significantly improved s/p thrombectomy. Patient denies subjective fever/chills, CP, SOB, n/v, or abdominal pain.      Objective:  Vitals:  T(C): 37 (10-10-23 @ 15:05), Max: 37.2 (10-10-23 @ 11:41)  HR: 95 (10-10-23 @ 15:15) (95 - 118)  BP: 131/103 (10-10-23 @ 15:15) (105/70 - 171/91)  RR: 17 (10-10-23 @ 15:15) (16 - 18)  SpO2: 100% (10-10-23 @ 15:15) (94% - 100%)  Wt(kg): --    10-10 @ 07:01  -  10-10 @ 16:16  --------------------------------------------------------  IN:    Oral Fluid: 180 mL  Total IN: 180 mL    OUT:  Total OUT: 0 mL    Total NET: 180 mL      Physical Exam:  General Appearance: no acute distress, NTND   Chest: airway intact, non-labored breathing  CV: no JVD, palpable pulses b/l  Abdomen: soft, non-tender, non-distended, abdominal binder in place, previous surgical incisions c/d/i   Extremities: WWP       Labs:                        10.9   9.91  )-----------( 188      ( 10 Oct 2023 06:49 )             33.7     10-10    141  |  106  |  10  ----------------------------<  123<H>  3.9   |  22  |  0.61    Ca    8.8      10 Oct 2023 06:49    TPro  6.9  /  Alb  3.2<L>  /  TBili  0.6  /  DBili  x   /  AST  46<H>  /  ALT  26  /  AlkPhos  78  10-09    LIVER FUNCTIONS - ( 09 Oct 2023 17:10 )  Alb: 3.2 g/dL / Pro: 6.9 g/dL / ALK PHOS: 78 U/L / ALT: 26 U/L / AST: 46 U/L / GGT: x           PT/INR - ( 09 Oct 2023 17:10 )   PT: 13.1 sec;   INR: 1.20 ratio         PTT - ( 10 Oct 2023 06:49 )  PTT:83.1 sec  Urinalysis Basic - ( 10 Oct 2023 06:49 )    Color: x / Appearance: x / SG: x / pH: x  Gluc: 123 mg/dL / Ketone: x  / Bili: x / Urobili: x   Blood: x / Protein: x / Nitrite: x   Leuk Esterase: x / RBC: x / WBC x   Sq Epi: x / Non Sq Epi: x / Bacteria: x              A/P: 52yFemale  - Dr. Santiago aware of patient   - c/w hep gtt  - ambulate/OOBTC as tolerated  - plastics will follow while admitted  - care per primary team    Andrea Ireland PGY1

## 2023-10-11 ENCOUNTER — TRANSCRIPTION ENCOUNTER (OUTPATIENT)
Age: 53
End: 2023-10-11

## 2023-10-11 VITALS
TEMPERATURE: 98 F | RESPIRATION RATE: 18 BRPM | OXYGEN SATURATION: 95 % | WEIGHT: 262.79 LBS | DIASTOLIC BLOOD PRESSURE: 82 MMHG | SYSTOLIC BLOOD PRESSURE: 118 MMHG | HEART RATE: 90 BPM

## 2023-10-11 LAB
APTT BLD: 55.5 SEC — HIGH (ref 24.5–35.6)
HCT VFR BLD CALC: 33.5 % — LOW (ref 34.5–45)
HGB BLD-MCNC: 10.4 G/DL — LOW (ref 11.5–15.5)
HGB FLD-MCNC: 10.8 G/DL — LOW (ref 11.7–16.5)
HGB FLD-MCNC: 11.6 G/DL — LOW (ref 11.7–16.5)
MCHC RBC-ENTMCNC: 29.3 PG — SIGNIFICANT CHANGE UP (ref 27–34)
MCHC RBC-ENTMCNC: 31 GM/DL — LOW (ref 32–36)
MCV RBC AUTO: 94.4 FL — SIGNIFICANT CHANGE UP (ref 80–100)
NRBC # BLD: 0 /100 WBCS — SIGNIFICANT CHANGE UP (ref 0–0)
OXYHGB MFR BLDMV: 58 % — LOW (ref 90–95)
OXYHGB MFR BLDMV: 70.2 % — LOW (ref 90–95)
PLATELET # BLD AUTO: 204 K/UL — SIGNIFICANT CHANGE UP (ref 150–400)
RBC # BLD: 3.55 M/UL — LOW (ref 3.8–5.2)
RBC # FLD: 13.8 % — SIGNIFICANT CHANGE UP (ref 10.3–14.5)
SAO2 % BLD: 58.7 % — LOW (ref 60–90)
SAO2 % BLD: 71.4 % — SIGNIFICANT CHANGE UP (ref 60–90)
WBC # BLD: 8.33 K/UL — SIGNIFICANT CHANGE UP (ref 3.8–10.5)
WBC # FLD AUTO: 8.33 K/UL — SIGNIFICANT CHANGE UP (ref 3.8–10.5)

## 2023-10-11 PROCEDURE — 85730 THROMBOPLASTIN TIME PARTIAL: CPT

## 2023-10-11 PROCEDURE — 99285 EMERGENCY DEPT VISIT HI MDM: CPT | Mod: 25

## 2023-10-11 PROCEDURE — 85025 COMPLETE CBC W/AUTO DIFF WBC: CPT

## 2023-10-11 PROCEDURE — 82553 CREATINE MB FRACTION: CPT

## 2023-10-11 PROCEDURE — C1757: CPT

## 2023-10-11 PROCEDURE — 85027 COMPLETE CBC AUTOMATED: CPT

## 2023-10-11 PROCEDURE — 75743 ARTERY X-RAYS LUNGS: CPT | Mod: 59

## 2023-10-11 PROCEDURE — 37185 PRIM ART M-THRMBC SBSQ VSL: CPT

## 2023-10-11 PROCEDURE — 93356 MYOCRD STRAIN IMG SPCKL TRCK: CPT | Mod: 26

## 2023-10-11 PROCEDURE — 80048 BASIC METABOLIC PNL TOTAL CA: CPT

## 2023-10-11 PROCEDURE — 80053 COMPREHEN METABOLIC PANEL: CPT

## 2023-10-11 PROCEDURE — 37184 PRIM ART M-THRMBC 1ST VSL: CPT | Mod: 50

## 2023-10-11 PROCEDURE — 93005 ELECTROCARDIOGRAM TRACING: CPT

## 2023-10-11 PROCEDURE — 36415 COLL VENOUS BLD VENIPUNCTURE: CPT

## 2023-10-11 PROCEDURE — 85610 PROTHROMBIN TIME: CPT

## 2023-10-11 PROCEDURE — 82550 ASSAY OF CK (CPK): CPT

## 2023-10-11 PROCEDURE — 84484 ASSAY OF TROPONIN QUANT: CPT

## 2023-10-11 PROCEDURE — C1769: CPT

## 2023-10-11 PROCEDURE — 93306 TTE W/DOPPLER COMPLETE: CPT

## 2023-10-11 PROCEDURE — 82803 BLOOD GASES ANY COMBINATION: CPT

## 2023-10-11 PROCEDURE — 83880 ASSAY OF NATRIURETIC PEPTIDE: CPT

## 2023-10-11 PROCEDURE — 99233 SBSQ HOSP IP/OBS HIGH 50: CPT

## 2023-10-11 PROCEDURE — 36015 PLACE CATHETER IN ARTERY: CPT | Mod: 59

## 2023-10-11 PROCEDURE — C1887: CPT

## 2023-10-11 PROCEDURE — C1894: CPT

## 2023-10-11 PROCEDURE — 93356 MYOCRD STRAIN IMG SPCKL TRCK: CPT

## 2023-10-11 PROCEDURE — 93321 DOPPLER ECHO F-UP/LMTD STD: CPT | Mod: 26

## 2023-10-11 PROCEDURE — C8924: CPT

## 2023-10-11 PROCEDURE — 93308 TTE F-UP OR LMTD: CPT | Mod: 26

## 2023-10-11 PROCEDURE — 93321 DOPPLER ECHO F-UP/LMTD STD: CPT

## 2023-10-11 RX ORDER — APIXABAN 2.5 MG/1
1 TABLET, FILM COATED ORAL
Qty: 60 | Refills: 0
Start: 2023-10-11 | End: 2023-11-09

## 2023-10-11 RX ORDER — APIXABAN 2.5 MG/1
10 TABLET, FILM COATED ORAL EVERY 12 HOURS
Refills: 0 | Status: DISCONTINUED | OUTPATIENT
Start: 2023-10-04 | End: 2023-10-11

## 2023-10-11 RX ADMIN — HEPARIN SODIUM 4000 UNIT(S): 5000 INJECTION INTRAVENOUS; SUBCUTANEOUS at 09:42

## 2023-10-11 RX ADMIN — APIXABAN 10 MILLIGRAM(S): 2.5 TABLET, FILM COATED ORAL at 10:14

## 2023-10-11 RX ADMIN — Medication 650 MILLIGRAM(S): at 00:50

## 2023-10-11 RX ADMIN — HEPARIN SODIUM 2000 UNIT(S)/HR: 5000 INJECTION INTRAVENOUS; SUBCUTANEOUS at 09:34

## 2023-10-11 NOTE — DISCHARGE NOTE PROVIDER - HOSPITAL COURSE
HPI:  53 yo F w/ hx of HTN and recent abdominal surgery 4w ago who presented to [place] ED w/ LLE pain and SOB. Pt reports she underwent abd surg 4w ago. She was immobile for 1w then slowly mobilized. She had her abd drain removed 3d ago. Over the weekend she reported 2d of LLE swelling and pain and then began feeling mild dyspnea last night, notable w/ exertion as well as faint palpitations, feeling like her heart was racing. She then went to [place] ED for further eval. In their ED, pt was afebrile w/ HRs in the 110-130s, BP in the 130s/80s, satting 94% on RA. Labs showed WBC 13.7, normal Hgb and Plts, INR 1.12, D-Dimer 6497, normal Cr 0.8, HS-Trop I of 239.5, Pro-BNP 5130. CTPA showed extensive acute b/l PEs within the L and R main PA and multiple b/l lower order branches w/ an abnormal RV/LV ratio of 1.6 c/w RV strain.. LE Dopplers showed DVT within the L common femoral and profunda femoral veins as well as the L popliteal and posterior tibial veins and superficial thrombus in the L gastrocnemius and small saphenous veins. She was started on anticoagulation w/ heparin bolus and continued on a heparin gtt and a PERT call was initiated w/ ultimate plan for transfer to [facility] ED for further evaluation.     (09 Oct 2023 18:06)    Hospital Course: Patient was initiated on anticoagulation w/ heparin bolus and continued on a heparin gtt. Vascular cardiology was consulted s/p RHC, mechanical thrombectomy for PE 10/10/2023. Patient to transition to Eliquis 10mg Q12 x 7 days and then 5mg Q12 thereafter. Discharge/Dispo/Med rec discussed with attending Dr. Lou. Patient medically cleared for discharge ____ with outpatient follow up         Important Medication Changes and Reason:  -Eliquis 10mg Q12 x 7 days and then 5mg Q12 thereafter in setting of PE/DVT     Active or Pending Issues Requiring Follow-up:  - Follow up with PCP and vascular cardiology     Advanced Directives:   [x] Full code  [ ] DNR  [ ] Hospice    Discharge Diagnoses:  Bilateral pulmonary embolism   Acute deep vein thrombosis (DVT)  HTN (hypertension)          HPI:  53 yo F w/ hx of HTN and recent abdominal surgery 4w ago who presented to [place] ED w/ LLE pain and SOB. Pt reports she underwent abd surg 4w ago. She was immobile for 1w then slowly mobilized. She had her abd drain removed 3d ago. Over the weekend she reported 2d of LLE swelling and pain and then began feeling mild dyspnea last night, notable w/ exertion as well as faint palpitations, feeling like her heart was racing. She then went to [place] ED for further eval. In their ED, pt was afebrile w/ HRs in the 110-130s, BP in the 130s/80s, satting 94% on RA. Labs showed WBC 13.7, normal Hgb and Plts, INR 1.12, D-Dimer 6497, normal Cr 0.8, HS-Trop I of 239.5, Pro-BNP 5130. CTPA showed extensive acute b/l PEs within the L and R main PA and multiple b/l lower order branches w/ an abnormal RV/LV ratio of 1.6 c/w RV strain.. LE Dopplers showed DVT within the L common femoral and profunda femoral veins as well as the L popliteal and posterior tibial veins and superficial thrombus in the L gastrocnemius and small saphenous veins. She was started on anticoagulation w/ heparin bolus and continued on a heparin gtt and a PERT call was initiated w/ ultimate plan for transfer to [facility] ED for further evaluation.     (09 Oct 2023 18:06)    Hospital Course: Patient was initiated on anticoagulation w/ heparin bolus and continued on a heparin gtt. Vascular cardiology was consulted s/p RHC, mechanical thrombectomy for PE 10/10/2023. Patient to transition to Eliquis 10mg Q12 x 7 days and then 5mg Q12 thereafter. Discharge/Dispo/Med rec discussed with attending Dr. Lou. Patient medically cleared for discharge ____ with outpatient follow up         Important Medication Changes and Reason:  -Eliquis 10mg Q12 x 7 days and then 5mg Q12 thereafter in setting of PE/DVT     Active or Pending Issues Requiring Follow-up:  - Follow up with PCP and vascular cardiology     Advanced Directives:   [x] Full code  [ ] DNR  [ ] Hospice    Discharge Diagnoses:  Bilateral pulmonary embolism   Acute deep vein thrombosis (DVT)  HTN (hypertension)          HPI:  51 yo F w/ hx of HTN and recent abdominal surgery 4w ago who presented to [place] ED w/ LLE pain and SOB. Pt reports she underwent abd surg 4w ago. She was immobile for 1w then slowly mobilized. She had her abd drain removed 3d ago. Over the weekend she reported 2d of LLE swelling and pain and then began feeling mild dyspnea last night, notable w/ exertion as well as faint palpitations, feeling like her heart was racing. She then went to [place] ED for further eval. In their ED, pt was afebrile w/ HRs in the 110-130s, BP in the 130s/80s, satting 94% on RA. Labs showed WBC 13.7, normal Hgb and Plts, INR 1.12, D-Dimer 6497, normal Cr 0.8, HS-Trop I of 239.5, Pro-BNP 5130. CTPA showed extensive acute b/l PEs within the L and R main PA and multiple b/l lower order branches w/ an abnormal RV/LV ratio of 1.6 c/w RV strain.. LE Dopplers showed DVT within the L common femoral and profunda femoral veins as well as the L popliteal and posterior tibial veins and superficial thrombus in the L gastrocnemius and small saphenous veins. She was started on anticoagulation w/ heparin bolus and continued on a heparin gtt and a PERT call was initiated w/ ultimate plan for transfer to [facility] ED for further evaluation.     (09 Oct 2023 18:06)    Hospital Course: Patient was initiated on anticoagulation w/ heparin bolus and continued on a heparin gtt. Vascular cardiology was consulted s/p RHC, mechanical thrombectomy for PE 10/10/2023. Patient to transition to Eliquis 10mg Q12 x 7 days and then 5mg Q12 thereafter. Discharge/Dispo/Med rec discussed with attending Dr. Lou. Patient medically cleared for discharge ____ with outpatient follow up         Important Medication Changes and Reason:  -Eliquis 10mg Q12 x 7 days and then 5mg Q12 thereafter in setting of PE/DVT     Active or Pending Issues Requiring Follow-up:  - Follow up with PCP and vascular cardiology     Advanced Directives:   [x] Full code  [ ] DNR  [ ] Hospice    Discharge Diagnoses:  Bilateral pulmonary embolism   Acute deep vein thrombosis (DVT)  HTN (hypertension)

## 2023-10-11 NOTE — DISCHARGE NOTE PROVIDER - NSDCMRMEDTOKEN_GEN_ALL_CORE_FT
Allegra 180 mg oral tablet: 1 tab(s) orally once a day  ascorbic acid 500 mg oral tablet: 1 tab(s) orally once a day  cholecalciferol 25 mcg (1000 intl units) oral tablet: 1 tab(s) orally once a day  Eliquis Starter Pack for Treatment of DVT and PE 5 mg oral tablet: 1 tab(s) orally 2 times a day 1 tab bid x 7 days and then 1 tab  losartan 100 mg oral tablet: 1 tab(s) orally once a day

## 2023-10-11 NOTE — DISCHARGE NOTE NURSING/CASE MANAGEMENT/SOCIAL WORK - NSDCPEFALRISK_GEN_ALL_CORE
For information on Fall & Injury Prevention, visit: [email or web address] OR  [email or web address] OR  https://www.cdc.gov/steadi/patient.html For information on Fall & Injury Prevention, visit: [email or web address] OR  [email or web address] OR  https://www.cdc.gov/steadi/patient.html For information on Fall & Injury Prevention, visit: [email or web address] OR  [email or web address] OR  https://www.cdc.gov/steadi/patient.html

## 2023-10-11 NOTE — PROGRESS NOTE ADULT - SUBJECTIVE AND OBJECTIVE BOX
Interventional Cardiology Post Cath Progress Note:                Subjective:   Patient feels well- resting in bed with no current complaints- Denies chest pain, shortness of breath. Denies pain, numbness, tingling or swelling around right groin mattress suture access site    Tele 24hrs: NSR       MEDICATIONS  (STANDING):  ascorbic acid 500 milliGRAM(s) Oral daily  cholecalciferol 1000 Unit(s) Oral daily  heparin  Infusion.  Unit(s)/Hr (18 mL/Hr) IV Continuous <Continuous>  loratadine 10 milliGRAM(s) Oral once  losartan 100 milliGRAM(s) Oral daily    MEDICATIONS  (PRN):  acetaminophen     Tablet .. 650 milliGRAM(s) Oral every 6 hours PRN Temp greater or equal to 38.5C (101.3F), Moderate Pain (4 - 6)  heparin   Injectable 4000 Unit(s) IV Push every 6 hours PRN For aPTT between 40 - 57  heparin   Injectable 9000 Unit(s) IV Push every 6 hours PRN For aPTT less than 40      Objective:  Vital Signs Last 24 Hrs  T(C): 36.7 (11 Oct 2023 04:00), Max: 37.2 (10 Oct 2023 11:41)  T(F): 98.1 (11 Oct 2023 04:00), Max: 99 (10 Oct 2023 11:41)  HR: 90 (11 Oct 2023 04:00) (88 - 108)  BP: 118/82 (11 Oct 2023 04:00) (111/71 - 171/91)  BP(mean): 103 (10 Oct 2023 20:14) (97 - 109)  RR: 18 (11 Oct 2023 04:00) (16 - 18)  SpO2: 95% (11 Oct 2023 04:00) (94% - 100%)    Parameters below as of 11 Oct 2023 04:00  Patient On (Oxygen Delivery Method): room air        10-10-23 @ 07:01  -  10-11-23 @ 07:00  --------------------------------------------------------  IN: 180 mL / OUT: 0 mL / NET: 180 mL                              10.9   9.91  )-----------( 188      ( 10 Oct 2023 06:49 )             33.7     10-10    141  |  106  |  10  ----------------------------<  123<H>  3.9   |  22  |  0.61    Ca    8.8      10 Oct 2023 06:49    TPro  6.9  /  Alb  3.2<L>  /  TBili  0.6  /  DBili  x   /  AST  46<H>  /  ALT  26  /  AlkPhos  78  10-09    PT/INR - ( 09 Oct 2023 17:10 )   PT: 13.1 sec;   INR: 1.20 ratio         PTT - ( 10 Oct 2023 22:07 )  PTT:66.2 sec  Urinalysis Basic - ( 10 Oct 2023 06:49 )    Color: x / Appearance: x / SG: x / pH: x  Gluc: 123 mg/dL / Ketone: x  / Bili: x / Urobili: x   Blood: x / Protein: x / Nitrite: x   Leuk Esterase: x / RBC: x / WBC x   Sq Epi: x / Non Sq Epi: x / Bacteria: x      Physical Exam:  No apparent distress, alert and oriented times three, appropriate affect  Right groin: Mattress suture in place- removal completed at 0730. Post suture removal RLE is Soft, non tender, no bleeding or hematoma, dressing placed- RLE/LLE +1 palpable DP pulse  No clubbing, cyanosis or edema      Assessment/Plan:   HPI:  51 yo F w/ hx of HTN, s/p abdominoplasty and liposuction last month. Initially presented to [facility] ED w/ CC LE pain and SOB found to be tachy 110-130s and sating 94% on RA. Labs notable for D-Dimer 6497, HS-Trop I of 239.5, Pro-BNP 5130. CTPA showed extensive acute b/l PEs within the L and R main PA and multiple b/l lower order branches w/ an abnormal RV/LV ratio of 1.6 c/w RV strain.. LE Dopplers showed DVT within the L common femoral and profunda femoral veins as well as the L popliteal and posterior tibial veins and superficial thrombus in the L gastrocnemius and small saphenous veins. She was started on anticoagulation w/ heparin bolus and continued on a heparin gtt. Patient transferred to [facility] where she underwent mechanical thrombectomy.    10/10/2023  s/p RHC, Mechanical thrombectomy for PE    - R groin mattress suture removed- right groin site is stable  - Pt currently on Hep infusion- can transition to oral anticoagulation  -f/u appt in 2 weeks post dc with Dr Kobi Cedillo  - Keep Mg >2 K>4   - cont to monitor on tele  - all other care as per primary     Please check [email or web address] password [email or web address] for cardiology service schedule and contact information via TEAMS.                                                                                             Interventional Cardiology Post Cath Progress Note:                Subjective:   Patient feels well- resting in bed with no current complaints- Denies chest pain, shortness of breath. Denies pain, numbness, tingling or swelling around right groin mattress suture access site    Tele 24hrs: NSR       MEDICATIONS  (STANDING):  ascorbic acid 500 milliGRAM(s) Oral daily  cholecalciferol 1000 Unit(s) Oral daily  heparin  Infusion.  Unit(s)/Hr (18 mL/Hr) IV Continuous <Continuous>  loratadine 10 milliGRAM(s) Oral once  losartan 100 milliGRAM(s) Oral daily    MEDICATIONS  (PRN):  acetaminophen     Tablet .. 650 milliGRAM(s) Oral every 6 hours PRN Temp greater or equal to 38.5C (101.3F), Moderate Pain (4 - 6)  heparin   Injectable 4000 Unit(s) IV Push every 6 hours PRN For aPTT between 40 - 57  heparin   Injectable 9000 Unit(s) IV Push every 6 hours PRN For aPTT less than 40      Objective:  Vital Signs Last 24 Hrs  T(C): 36.7 (11 Oct 2023 04:00), Max: 37.2 (10 Oct 2023 11:41)  T(F): 98.1 (11 Oct 2023 04:00), Max: 99 (10 Oct 2023 11:41)  HR: 90 (11 Oct 2023 04:00) (88 - 108)  BP: 118/82 (11 Oct 2023 04:00) (111/71 - 171/91)  BP(mean): 103 (10 Oct 2023 20:14) (97 - 109)  RR: 18 (11 Oct 2023 04:00) (16 - 18)  SpO2: 95% (11 Oct 2023 04:00) (94% - 100%)    Parameters below as of 11 Oct 2023 04:00  Patient On (Oxygen Delivery Method): room air        10-10-23 @ 07:01  -  10-11-23 @ 07:00  --------------------------------------------------------  IN: 180 mL / OUT: 0 mL / NET: 180 mL                              10.9   9.91  )-----------( 188      ( 10 Oct 2023 06:49 )             33.7     10-10    141  |  106  |  10  ----------------------------<  123<H>  3.9   |  22  |  0.61    Ca    8.8      10 Oct 2023 06:49    TPro  6.9  /  Alb  3.2<L>  /  TBili  0.6  /  DBili  x   /  AST  46<H>  /  ALT  26  /  AlkPhos  78  10-09    PT/INR - ( 09 Oct 2023 17:10 )   PT: 13.1 sec;   INR: 1.20 ratio         PTT - ( 10 Oct 2023 22:07 )  PTT:66.2 sec  Urinalysis Basic - ( 10 Oct 2023 06:49 )    Color: x / Appearance: x / SG: x / pH: x  Gluc: 123 mg/dL / Ketone: x  / Bili: x / Urobili: x   Blood: x / Protein: x / Nitrite: x   Leuk Esterase: x / RBC: x / WBC x   Sq Epi: x / Non Sq Epi: x / Bacteria: x      Physical Exam:  No apparent distress, alert and oriented times three, appropriate affect  Right groin: Mattress suture in place- removal completed at 0730. Post suture removal RLE is Soft, non tender, no bleeding or hematoma, dressing placed- RLE/LLE +1 palpable DP pulse  No clubbing, cyanosis or edema      Assessment/Plan:   HPI:  51 yo F w/ hx of HTN, s/p abdominoplasty and liposuction last month. Initially presented to [facility] ED w/ CC LE pain and SOB found to be tachy 110-130s and sating 94% on RA. Labs notable for D-Dimer 6497, HS-Trop I of 239.5, Pro-BNP 5130. CTPA showed extensive acute b/l PEs within the L and R main PA and multiple b/l lower order branches w/ an abnormal RV/LV ratio of 1.6 c/w RV strain.. LE Dopplers showed DVT within the L common femoral and profunda femoral veins as well as the L popliteal and posterior tibial veins and superficial thrombus in the L gastrocnemius and small saphenous veins. She was started on anticoagulation w/ heparin bolus and continued on a heparin gtt. Patient transferred to [facility] where she underwent mechanical thrombectomy.    10/10/2023  s/p RHC, Mechanical thrombectomy for PE    - R groin mattress suture removed- right groin site is stable  - Pt currently on Hep infusion- can transition to oral anticoagulation  -f/u appt in 2 weeks post dc with Dr Kobi Cedillo  - Keep Mg >2 K>4   - cont to monitor on tele  - all other care as per primary     Please check [email or web address] password [email or web address] for cardiology service schedule and contact information via TEAMS.                                                                                             Interventional Cardiology Post Cath Progress Note:                Subjective:   Patient feels well- resting in bed with no current complaints- Denies chest pain, shortness of breath. Denies pain, numbness, tingling or swelling around right groin mattress suture access site    Tele 24hrs: NSR       MEDICATIONS  (STANDING):  ascorbic acid 500 milliGRAM(s) Oral daily  cholecalciferol 1000 Unit(s) Oral daily  heparin  Infusion.  Unit(s)/Hr (18 mL/Hr) IV Continuous <Continuous>  loratadine 10 milliGRAM(s) Oral once  losartan 100 milliGRAM(s) Oral daily    MEDICATIONS  (PRN):  acetaminophen     Tablet .. 650 milliGRAM(s) Oral every 6 hours PRN Temp greater or equal to 38.5C (101.3F), Moderate Pain (4 - 6)  heparin   Injectable 4000 Unit(s) IV Push every 6 hours PRN For aPTT between 40 - 57  heparin   Injectable 9000 Unit(s) IV Push every 6 hours PRN For aPTT less than 40      Objective:  Vital Signs Last 24 Hrs  T(C): 36.7 (11 Oct 2023 04:00), Max: 37.2 (10 Oct 2023 11:41)  T(F): 98.1 (11 Oct 2023 04:00), Max: 99 (10 Oct 2023 11:41)  HR: 90 (11 Oct 2023 04:00) (88 - 108)  BP: 118/82 (11 Oct 2023 04:00) (111/71 - 171/91)  BP(mean): 103 (10 Oct 2023 20:14) (97 - 109)  RR: 18 (11 Oct 2023 04:00) (16 - 18)  SpO2: 95% (11 Oct 2023 04:00) (94% - 100%)    Parameters below as of 11 Oct 2023 04:00  Patient On (Oxygen Delivery Method): room air        10-10-23 @ 07:01  -  10-11-23 @ 07:00  --------------------------------------------------------  IN: 180 mL / OUT: 0 mL / NET: 180 mL                              10.9   9.91  )-----------( 188      ( 10 Oct 2023 06:49 )             33.7     10-10    141  |  106  |  10  ----------------------------<  123<H>  3.9   |  22  |  0.61    Ca    8.8      10 Oct 2023 06:49    TPro  6.9  /  Alb  3.2<L>  /  TBili  0.6  /  DBili  x   /  AST  46<H>  /  ALT  26  /  AlkPhos  78  10-09    PT/INR - ( 09 Oct 2023 17:10 )   PT: 13.1 sec;   INR: 1.20 ratio         PTT - ( 10 Oct 2023 22:07 )  PTT:66.2 sec  Urinalysis Basic - ( 10 Oct 2023 06:49 )    Color: x / Appearance: x / SG: x / pH: x  Gluc: 123 mg/dL / Ketone: x  / Bili: x / Urobili: x   Blood: x / Protein: x / Nitrite: x   Leuk Esterase: x / RBC: x / WBC x   Sq Epi: x / Non Sq Epi: x / Bacteria: x      Physical Exam:  No apparent distress, alert and oriented times three, appropriate affect  Right groin: Mattress suture in place- removal completed at 0730. Post suture removal RLE is Soft, non tender, no bleeding or hematoma, dressing placed- RLE/LLE +1 palpable DP pulse  No clubbing, cyanosis or edema      Assessment/Plan:   HPI:  51 yo F w/ hx of HTN, s/p abdominoplasty and liposuction last month. Initially presented to [facility] ED w/ CC LE pain and SOB found to be tachy 110-130s and sating 94% on RA. Labs notable for D-Dimer 6497, HS-Trop I of 239.5, Pro-BNP 5130. CTPA showed extensive acute b/l PEs within the L and R main PA and multiple b/l lower order branches w/ an abnormal RV/LV ratio of 1.6 c/w RV strain.. LE Dopplers showed DVT within the L common femoral and profunda femoral veins as well as the L popliteal and posterior tibial veins and superficial thrombus in the L gastrocnemius and small saphenous veins. She was started on anticoagulation w/ heparin bolus and continued on a heparin gtt. Patient transferred to [facility] where she underwent mechanical thrombectomy.    10/10/2023  s/p RHC, Mechanical thrombectomy for PE    - R groin mattress suture removed- right groin site is stable  - Pt currently on Hep infusion- can transition to oral anticoagulation  -f/u appt in 2 weeks post dc with Dr Kobi Cedillo  - Keep Mg >2 K>4   - cont to monitor on tele  - all other care as per primary     Please check [email or web address] password [email or web address] for cardiology service schedule and contact information via TEAMS.

## 2023-10-11 NOTE — DISCHARGE NOTE PROVIDER - NSDCCPCAREPLAN_GEN_ALL_CORE_FT
PRINCIPAL DISCHARGE DIAGNOSIS  Diagnosis: Acute pulmonary embolism  Assessment and Plan of Treatment: s/p Right Heart Cath and mechanical thrombectomy 10/10/23  Please continue Eliquis as prescribed   Follow up with your health care provider within one week. Call for appointment.  If you develop shortness of breath or if your shortness of breath worsens call your Health Care Provider or go to the Emergency Department.        SECONDARY DISCHARGE DIAGNOSES  Diagnosis: Acute deep vein thrombosis (DVT)  Assessment and Plan of Treatment: Please continue Eliquis as prescribed.  Blood thinners can result in abnormal bleeding and brushing.  Be mindful to avoid accidental trauma.  Participate in activities as prescribed.  If you develop new leg pain, swelling, and/or redness contact your healthcare provider.  Call your doctor if you experience lightheadedness, loss of consciousness, shortness of breath (especially with exercise), feel your heart racing or beating unusually, or experience frequent or abnormal bleeding.

## 2023-10-11 NOTE — DISCHARGE NOTE NURSING/CASE MANAGEMENT/SOCIAL WORK - PATIENT PORTAL LINK FT
You can access the FollowMyHealth Patient Portal offered by [facility] by registering at the following website: [email or web address]. By joining [email or web address]’s FollowMyHealth portal, you will also be able to view your health information using other applications (apps) compatible with our system. You can access the FollowMyHealth Patient Portal offered by [facility] by registering at the following website: [email or web address]. By joining [email or web address]’s FollowMyHealth portal, you will also be able to view your health information using other applications (apps) compatible with our system. You can access the FollowMyHealth Patient Portal offered by [facility] by registering at the following website: [email or web address]. By joining [facility]’s FollowMyHealth portal, you will also be able to view your health information using other applications (apps) compatible with our system.

## 2023-10-11 NOTE — PROGRESS NOTE ADULT - ASSESSMENT
51 yo F w/ HTN and recent abd surgery 4w ago presenting w/ 2-3d of LLE swelling and 1d of SOB/palpitations found to be mildly tachycardic w/ normal BP and SpO2 and b/l main pulmonary artery PEs and LLE DVTs on imaging w/ elevated pro-BNP and trop c/w intermediate-high risk PE for which the PERT team was consulted.    1. B/l PEs - Initially mildly tachy to 110-130s, normal BP and normal SpO2 on RA. CTPA w/ RV dilation c/f RV strain. Trop and pro-BNP mildly elevated. Overall picture c/w intermediate high risk PE.  2. LLE DVT - pt found to have LLE DVT within the common femoral, profunda femoral, popliteal and PT arteries. Also w/ superficial DVT within the L gastrocneumis and small saphenous vein. On AC.  3. HTN    Recommendation:  - s/p RHC and mechanical thrombectomy yesterday  - Can switch to Eliquis 10mg Q12 x 7 days and then 5mg Q12 thereafter   - Repeat TTE pending  - Continue Losartan 100mg daily  - Monitor on telemetry    Note not final until signed by attending       Basim Vincent MD  Cardiology Fellow PGY-6  Phone: [phone]    For all New Consults  [email or web address]   Login: [employer] 51 yo F w/ HTN and recent abd surgery 4w ago presenting w/ 2-3d of LLE swelling and 1d of SOB/palpitations found to be mildly tachycardic w/ normal BP and SpO2 and b/l main pulmonary artery PEs and LLE DVTs on imaging w/ elevated pro-BNP and trop c/w intermediate-high risk PE for which the PERT team was consulted.    1. B/l PEs - Initially mildly tachy to 110-130s, normal BP and normal SpO2 on RA. CTPA w/ RV dilation c/f RV strain. Trop and pro-BNP mildly elevated. Overall picture c/w intermediate high risk PE.  2. LLE DVT - pt found to have LLE DVT within the common femoral, profunda femoral, popliteal and PT arteries. Also w/ superficial DVT within the L gastrocneumis and small saphenous vein. On AC.  3. HTN    Recommendation:  - s/p RHC and mechanical thrombectomy yesterday  - Can switch to Eliquis 10mg Q12 x 7 days and then 5mg Q12 thereafter   - Repeat TTE pending  - Continue Losartan 100mg daily  - Monitor on telemetry    Note not final until signed by attending       Basim Vincent MD  Cardiology Fellow PGY-6  Phone: [phone]    For all New Consults  [email or web address]   Login: [employer] 51 yo F w/ HTN and recent abd surgery 4w ago presenting w/ 2-3d of LLE swelling and 1d of SOB/palpitations found to be mildly tachycardic w/ normal BP and SpO2 and b/l main pulmonary artery PEs and LLE DVTs on imaging w/ elevated pro-BNP and trop c/w intermediate-high risk PE for which the PERT team was consulted.    1. B/l PEs - Initially mildly tachy to 110-130s, normal BP and normal SpO2 on RA. CTPA w/ RV dilation c/f RV strain. Trop and pro-BNP mildly elevated. Overall picture c/w intermediate high risk PE.  2. LLE DVT - pt found to have LLE DVT within the common femoral, profunda femoral, popliteal and PT arteries. Also w/ superficial DVT within the L gastrocneumis and small saphenous vein. On AC.  3. HTN    Recommendation:  - s/p RHC and mechanical thrombectomy yesterday  - Can switch to Eliquis 10mg Q12 x 7 days and then 5mg Q12 thereafter   - Repeat TTE pending  - Continue Losartan 100mg daily  - Monitor on telemetry    Note not final until signed by attending       Basim Vincent MD  Cardiology Fellow PGY-6  Phone: [phone]    For all New Consults  [email or web address]   Login: [email or web address]

## 2023-10-11 NOTE — PHARMACOTHERAPY INTERVENTION NOTE - COMMENTS
Counseled patient on Eliquis (brand/generic), indication, directions for use (2 tablets (10mg) twice daily 10/11-17; then 1 tablet (5mg) twice daily starting 10/18 thereafter) and possible side effects (bleeding). Counseled patient on taking acetaminophen over the counter if needed and to avoid NSAID medications like Advil or Aleve since they can increase bleeding risk. Patient was provided with a medication card for her new medication. Patient questions and concerns were answered and addressed. Patient demonstrated understanding. Patient understood importance of compliance and to follow up with cardiologist once discharged.    Eliquis starter pack sent to [facility] and covered for $40/month copay. Prescription delivered to patient's bedside.     Chinedu AlexanderD  Available on [employer] Teams  Spectra #[phone]   Counseled patient on Eliquis (brand/generic), indication, directions for use (2 tablets (10mg) twice daily 10/11-17; then 1 tablet (5mg) twice daily starting 10/18 thereafter) and possible side effects (bleeding). Counseled patient on taking acetaminophen over the counter if needed and to avoid NSAID medications like Advil or Aleve since they can increase bleeding risk. Patient was provided with a medication card for her new medication. Patient questions and concerns were answered and addressed. Patient demonstrated understanding. Patient understood importance of compliance and to follow up with cardiologist once discharged.    Eliquis starter pack sent to [facility] and covered for $40/month copay. Prescription delivered to patient's bedside.     Chinedu AlexanderD  Available on [employer] Teams  Spectra #[phone]   Counseled patient on Eliquis (brand/generic), indication, directions for use (2 tablets (10mg) twice daily 10/11-17; then 1 tablet (5mg) twice daily starting 10/18 thereafter) and possible side effects (bleeding). Counseled patient on taking acetaminophen over the counter if needed and to avoid NSAID medications like Advil or Aleve since they can increase bleeding risk. Patient was provided with a medication card for her new medication. Patient questions and concerns were answered and addressed. Patient demonstrated understanding. Patient understood importance of compliance and to follow up with cardiologist once discharged.    Eliquis starter pack sent to [facility] and covered for $40/month copay. Prescription delivered to patient's bedside.     Chinedu AlexanderD  Available on [employer] Teams  Spectra #[phone]

## 2023-10-11 NOTE — PROGRESS NOTE ADULT - SUBJECTIVE AND OBJECTIVE BOX
Date of Service  : 10-11-23     INTERVAL HPI/OVERNIGHT EVENTS: I feel great.   Vital Signs Last 24 Hrs  T(C): 36.7 (11 Oct 2023 04:00), Max: 36.7 (11 Oct 2023 04:00)  T(F): 98.1 (11 Oct 2023 04:00), Max: 98.1 (11 Oct 2023 04:00)  HR: 90 (11 Oct 2023 04:00) (88 - 101)  BP: 118/82 (11 Oct 2023 04:00) (111/71 - 142/89)  BP(mean): 103 (10 Oct 2023 20:14) (97 - 109)  RR: 18 (11 Oct 2023 04:00) (17 - 18)  SpO2: 95% (11 Oct 2023 04:00) (94% - 100%)    Parameters below as of 11 Oct 2023 04:00  Patient On (Oxygen Delivery Method): room air      I&O's Summary    10 Oct 2023 07:01  -  11 Oct 2023 07:00  --------------------------------------------------------  IN: 180 mL / OUT: 0 mL / NET: 180 mL    11 Oct 2023 07:01  -  11 Oct 2023 15:14  --------------------------------------------------------  IN: 600 mL / OUT: 0 mL / NET: 600 mL      MEDICATIONS  (STANDING):  apixaban 10 milliGRAM(s) Oral every 12 hours  ascorbic acid 500 milliGRAM(s) Oral daily  cholecalciferol 1000 Unit(s) Oral daily  loratadine 10 milliGRAM(s) Oral once  losartan 100 milliGRAM(s) Oral daily    MEDICATIONS  (PRN):  acetaminophen     Tablet .. 650 milliGRAM(s) Oral every 6 hours PRN Temp greater or equal to 38.5C (101.3F), Moderate Pain (4 - 6)    LABS:                        10.4   8.33  )-----------( 204      ( 11 Oct 2023 07:27 )             33.5     10-10    141  |  106  |  10  ----------------------------<  123<H>  3.9   |  22  |  0.61    Ca    8.8      10 Oct 2023 06:49    TPro  6.9  /  Alb  3.2<L>  /  TBili  0.6  /  DBili  x   /  AST  46<H>  /  ALT  26  /  AlkPhos  78  10-09    PT/INR - ( 09 Oct 2023 17:10 )   PT: 13.1 sec;   INR: 1.20 ratio         PTT - ( 11 Oct 2023 07:27 )  PTT:55.5 sec  Urinalysis Basic - ( 10 Oct 2023 06:49 )    Color: x / Appearance: x / SG: x / pH: x  Gluc: 123 mg/dL / Ketone: x  / Bili: x / Urobili: x   Blood: x / Protein: x / Nitrite: x   Leuk Esterase: x / RBC: x / WBC x   Sq Epi: x / Non Sq Epi: x / Bacteria: x      CAPILLARY BLOOD GLUCOSE            Urinalysis Basic - ( 10 Oct 2023 06:49 )    Color: x / Appearance: x / SG: x / pH: x  Gluc: 123 mg/dL / Ketone: x  / Bili: x / Urobili: x   Blood: x / Protein: x / Nitrite: x   Leuk Esterase: x / RBC: x / WBC x   Sq Epi: x / Non Sq Epi: x / Bacteria: x      REVIEW OF SYSTEMS:  CONSTITUTIONAL: No fever, weight loss, or fatigue  EYES: No eye pain, visual disturbances, or discharge  ENMT:  No difficulty hearing, tinnitus, vertigo; No sinus or throat pain  NECK: No pain or stiffness  RESPIRATORY: No cough, wheezing, chills or hemoptysis; No shortness of breath  CARDIOVASCULAR: No chest pain, palpitations, dizziness, or leg swelling  GASTROINTESTINAL: No abdominal or epigastric pain. No nausea, vomiting, or hematemesis; No diarrhea or constipation. No melena or hematochezia.  GENITOURINARY: No dysuria, frequency, hematuria, or incontinence  NEUROLOGICAL: No headaches, memory loss, loss of strength, numbness, or tremors      RADIOLOGY & ADDITIONAL TESTS:    Consultant(s) Notes Reviewed:  [x ] YES  [ ] NO    PHYSICAL EXAM:  GENERAL: NAD, well-groomed, well-developed,not in any distress ,  HEAD:  Atraumatic, Normocephalic  EYES: EOMI, PERRLA, conjunctiva and sclera clear  ENMT: No tonsillar erythema, exudates, or enlargement; Moist mucous membranes, Good dentition, No lesions  NECK: Supple, No JVD, Normal thyroid  NERVOUS SYSTEM:  Alert & Oriented X3, No focal deficit   CHEST/LUNG: Good air entry bilateral with no  rales, rhonchi, wheezing, or rubs  HEART: Regular rate and rhythm; No murmurs, rubs, or gallops  ABDOMEN: Soft, Nontender, Nondistended; Bowel sounds present  EXTREMITIES:  2+ Peripheral Pulses, No clubbing, cyanosis, or edema    Care Discussed with Consultants/Other Providers [ x] YES  [ ] NO

## 2023-10-11 NOTE — DISCHARGE NOTE PROVIDER - CARE PROVIDER_API CALL
Kobi Cedillo  Interventional Cardiology  [address] [place] [address], [address] [place] [id number]  Phone: [phone]  Fax: [phone]  Follow Up Time: 1 week   Kobi Cedillo  Interventional Cardiology  [address] [place] [address], [address] [place] [id number]  Phone: [phone]  Fax: [phone]  Follow Up Time: 1 week   Kobi Cedillo  Interventional Cardiology  [address] [place] [address], [address] [place] [id number]  Phone: [phone]  Fax: [phone]  Follow Up Time: 1 week

## 2023-10-11 NOTE — PROGRESS NOTE ADULT - SUBJECTIVE AND OBJECTIVE BOX
Patient seen and examined at bedside.    Overnight Events:   - NSR 70s  - s/p RHC and thrombectomy yesterday  - removal of mattress suture this AM    REVIEW OF SYSTEMS:  General: no fatigue/malaise, weight loss/gain.  Skin: no rashes.  Ophthalmologic: no blurred vision, no loss of vision. 	  ENT: no sore throat, rhinorrhea, sinus congestion.  Respiratory: no SOB, cough or wheeze.  CV: No chest pain. No palpitations.   Gastrointestinal:  no N/V/D, no melena/hematemesis/hematochezia.  Genitourinary: no dysuria/hesitancy or hematuria.  Musculoskeletal: no myalgias or arthralgias.  Neurological: no changes in vision or hearing, no lightheadedness/dizziness, no syncope/near syncope	  Psychiatric: no unusual stress/anxiety.   Hematology/Lymphatics: no unusual bleeding, bruising and no lymphadenopathy.  Endocrine: no unusual thirst.           Current Meds:  acetaminophen     Tablet .. 650 milliGRAM(s) Oral every 6 hours PRN  ascorbic acid 500 milliGRAM(s) Oral daily  cholecalciferol 1000 Unit(s) Oral daily  heparin   Injectable 4000 Unit(s) IV Push every 6 hours PRN  heparin   Injectable 9000 Unit(s) IV Push every 6 hours PRN  heparin  Infusion.  Unit(s)/Hr IV Continuous <Continuous>  loratadine 10 milliGRAM(s) Oral once  losartan 100 milliGRAM(s) Oral daily      Vitals:  T(F): 98.1 (10-11), Max: 99 (10-10)  HR: 90 (10-11) (88 - 108)  BP: 118/82 (10-11) (111/71 - 171/91)  RR: 18 (10-11)  SpO2: 95% (10-11)  I&O's Summary    10 Oct 2023 07:01  -  11 Oct 2023 07:00  --------------------------------------------------------  IN: 180 mL / OUT: 0 mL / NET: 180 mL        Physical Exam:  Appearance: No acute distress; well appearing  Eyes: PERRL, EOMI, pink conjunctiva  HEENT: Normal oral mucosa  Cardiovascular: RRR, S1, S2, no murmurs, rubs, or gallops; no edema; no JVD  Access: R groin some tenderness C/D/I no hematoma  Respiratory: Clear to auscultation bilaterally  Gastrointestinal: soft, non-tender, non-distended with normal bowel sounds  Musculoskeletal: No clubbing; no joint deformity   Extremities: 2+ DP/PT pulses   Neurologic: Non-focal  Lymphatic: No lymphadenopathy  Psychiatry: AAOx3, mood & affect appropriate  Skin: No rashes, ecchymoses, or cyanosis                          10.4   8.33  )-----------( 204      ( 11 Oct 2023 07:27 )             33.5     10-10    141  |  106  |  10  ----------------------------<  123<H>  3.9   |  22  |  0.61    Ca    8.8      10 Oct 2023 06:49    TPro  6.9  /  Alb  3.2<L>  /  TBili  0.6  /  DBili  x   /  AST  46<H>  /  ALT  26  /  AlkPhos  78  10-09    PT/INR - ( 09 Oct 2023 17:10 )   PT: 13.1 sec;   INR: 1.20 ratio         PTT - ( 11 Oct 2023 07:27 )  PTT:55.5 sec  CARDIAC MARKERS ( 10 Oct 2023 06:49 )  63 ng/L / x     / x     / x     / x     / x      CARDIAC MARKERS ( 09 Oct 2023 23:52 )  72 ng/L / x     / x     / 50 U/L / x     / 3.5 ng/mL  CARDIAC MARKERS ( 09 Oct 2023 17:10 )  74 ng/L / x     / x     / x     / x     / x              tudy Date:     10/10/2023   Name:           SHIRLENE BERRY   :            1970   (52 years)   Gender:         female   MR#:            [id number]   [facility]#:           [id number]   Patient Class:  Inpatient     Cath Lab Report    Diagnostic Cardiologist:       Kobi Cedillo MD   Interventional Cardiologist:   Kobi Cedillo MD   Referring Physician:           CAROLINA LOU     Procedures Performed   Procedures:               1.    Venous Access - Right Femoral     2.    RHC   3.    Ultrasound Guided Access   4.    PE - Thrombectomy   5.    PE - Thrombectomy   6.    Venogram   7.    PA Angiography   8.    PA Angiography     Indications:                Intermediate to High Risk Submassive  Pulmonary Embolism  Lab Visit Indication:      Intermediate to High Risk Submassive  Pulmonary Embolism    Conclusions:     Procedures:   The patient was brought to the cardiac catheterization laboratory.  She was prepped and draped in the typical sterile fashion.  The patient hemodynamics were closely monitored throughout the entire  procedure including her blood pressure, and heart rate,  respiratory rate, oxygenation and mental status.  Fentanyl and Versed  were given for sedation.    Using SonoSite guidance the right common femoral vein was visualized.  20 cc of 1% lidocaine was administered over the  puncture site.  Using SonoSite guidance the right common femoral vein  was punctured with a micropuncture system and a 6  Russian sheath was inserted.  Heparin was administered.  ACT was  checked during the procedure.    The 6 Russian sheath was then upsized to a 24 [place] Dryseal sheath.  Through the 6Fr sheath venography was performed of  the right common femoral vein and right external iliac vein.  No  thrombus was visualized.  A pigtail catheter was placed into the  inferior vena cava.  Venography was performed that demonstrated no  clot in the inferior vena cava.  Using a [place]-Bernardo catheter  a right heart cardiac catheterization was completed.     The [place]-Bernardo catheter was exchanged for a pigtail catheter which was  placed into the left pulmonary artery.  Pulmonary  angiography was performed that demonstrated extensive thrombus burden.  The pigtail catheter was then placed into the right  pulmonary artery that demonstratedextensive thrombus burden.       The pigtail catheter was placed into the right pulmonary artery and  exchanged for a multipurpose catheter.  The multipurpose  catheter was then exchanged for an Amplatz superstiff wire.  Over the  Amplatz Super Stiffwire a 24 [place] FlowTriever which  had been prepped according to  instructions was advanced.  Mechanical thrombectomy was performed of the right  interlobar pulmonary artery, truncus arteriosus and the right main  pulmonary artery.  Repeat pulmonary angiography  demonstrated significant reduction of thrombus burden.      Patient: SHIRLENE BERRY       MRN: [id number]  Study Date: 10/10/2023   12:21 PM      Page 1 of 6          The 24 [place] FlowTriever system was then placed into the left  pulmonary artery.  Mechanical thrombectomy was performed  with reduction of thrombus burden.  Through the 24 [place] FlowTriever  system a 20 [place] curved FlowTriever system which  had been prepped according to  instructions was inserted.  Thrombectomy was performed of the left lower lobe  segmental branches, left interlobar artery, left upper lob segmental  branches and left main pulmonary artery.  Repeat pulmonary  angiography demonstrated significant reduction of thrombus burden.     Postintervention hemodynamics were checked.     The FlowTriever system was removed from the patientâs body.  The 24  [place] Dryseal sheath was removed and a mattress  suture was delivered.  Pressure was held for 15 minutes.  Protamine  sulfate was administered prior to removal of the sheath.    The patient tolerated the procedure well.     Impressions:   Status post pre- and post- right heart cardiac catheterization with    --Reduction of sPAP from 47mmHg to 25mmHg   --Reduction of mPAP from 32mmHg to 17mmHg   --Increase in PAsat from 58.7% to 71.4%   --Increase in room air saturation from 87% to 100%     Status post venography of the right common femoral vein and right  iliac vein that demonstrated no thrombus    Status post venography of the inferior vena cava that demonstrated no  thrombus    Status post left and right pulmonary angiogram that demonstrated  extensive thrombus burden    Status post mechanical thrombectomy of the right interlobar pulmonary  artery, truncus arteriosus and the right main pulmonary  artery with significant reduction of thrombus burden     Status post mechanical thrombectomy of the left lower lobe segmental  branches, left interlobar artery, left upper lob segmental  branches and left main pulmonary artery with significant reduction of  thrombus burden    Recommendations:     Keep right leg straight for 6 hours following removal of sheath   Remove mattress suture in approximately 24 hours   Resume heparin drip with close monitoring of PTTwith plan to  transition to oral anticoagulant in the next 12-24 hours  Closely monitor for signs and symptoms of bleeding   Continue telemetry monitoring     Findings discussed with medicine/Dr. Lou     Procedure Narrative:   The risks and alternatives of the procedures and conscious sedation  were explained to the patient and informed consent was  obtained. The patient was brought to the cath lab and placed on the  exam table.  Access   Right femoral vein:   The puncture site was infiltrated with 1% Lidocaine. Vascular access  was obtained using Ultrasound guided micropuncture.     Patient seen and examined at bedside.    Overnight Events:   - NSR 70s  - s/p RHC and thrombectomy yesterday  - removal of mattress suture this AM    REVIEW OF SYSTEMS:  General: no fatigue/malaise, weight loss/gain.  Skin: no rashes.  Ophthalmologic: no blurred vision, no loss of vision. 	  ENT: no sore throat, rhinorrhea, sinus congestion.  Respiratory: no SOB, cough or wheeze.  CV: No chest pain. No palpitations.   Gastrointestinal:  no N/V/D, no melena/hematemesis/hematochezia.  Genitourinary: no dysuria/hesitancy or hematuria.  Musculoskeletal: no myalgias or arthralgias.  Neurological: no changes in vision or hearing, no lightheadedness/dizziness, no syncope/near syncope	  Psychiatric: no unusual stress/anxiety.   Hematology/Lymphatics: no unusual bleeding, bruising and no lymphadenopathy.  Endocrine: no unusual thirst.           Current Meds:  acetaminophen     Tablet .. 650 milliGRAM(s) Oral every 6 hours PRN  ascorbic acid 500 milliGRAM(s) Oral daily  cholecalciferol 1000 Unit(s) Oral daily  heparin   Injectable 4000 Unit(s) IV Push every 6 hours PRN  heparin   Injectable 9000 Unit(s) IV Push every 6 hours PRN  heparin  Infusion.  Unit(s)/Hr IV Continuous <Continuous>  loratadine 10 milliGRAM(s) Oral once  losartan 100 milliGRAM(s) Oral daily      Vitals:  T(F): 98.1 (10-11), Max: 99 (10-10)  HR: 90 (10-11) (88 - 108)  BP: 118/82 (10-11) (111/71 - 171/91)  RR: 18 (10-11)  SpO2: 95% (10-11)  I&O's Summary    10 Oct 2023 07:01  -  11 Oct 2023 07:00  --------------------------------------------------------  IN: 180 mL / OUT: 0 mL / NET: 180 mL        Physical Exam:  Appearance: No acute distress; well appearing  Eyes: PERRL, EOMI, pink conjunctiva  HEENT: Normal oral mucosa  Cardiovascular: RRR, S1, S2, no murmurs, rubs, or gallops; no edema; no JVD  Access: R groin some tenderness C/D/I no hematoma  Respiratory: Clear to auscultation bilaterally  Gastrointestinal: soft, non-tender, non-distended with normal bowel sounds  Musculoskeletal: No clubbing; no joint deformity   Extremities: 2+ DP/PT pulses   Neurologic: Non-focal  Lymphatic: No lymphadenopathy  Psychiatry: AAOx3, mood & affect appropriate  Skin: No rashes, ecchymoses, or cyanosis                          10.4   8.33  )-----------( 204      ( 11 Oct 2023 07:27 )             33.5     10-10    141  |  106  |  10  ----------------------------<  123<H>  3.9   |  22  |  0.61    Ca    8.8      10 Oct 2023 06:49    TPro  6.9  /  Alb  3.2<L>  /  TBili  0.6  /  DBili  x   /  AST  46<H>  /  ALT  26  /  AlkPhos  78  10-09    PT/INR - ( 09 Oct 2023 17:10 )   PT: 13.1 sec;   INR: 1.20 ratio         PTT - ( 11 Oct 2023 07:27 )  PTT:55.5 sec  CARDIAC MARKERS ( 10 Oct 2023 06:49 )  63 ng/L / x     / x     / x     / x     / x      CARDIAC MARKERS ( 09 Oct 2023 23:52 )  72 ng/L / x     / x     / 50 U/L / x     / 3.5 ng/mL  CARDIAC MARKERS ( 09 Oct 2023 17:10 )  74 ng/L / x     / x     / x     / x     / x              tudy Date:     10/10/2023   Name:           SHIRLENE BERRY   :            1970   (52 years)   Gender:         female   MR#:            [id number]   [facility]#:           [id number]   Patient Class:  Inpatient     Cath Lab Report    Diagnostic Cardiologist:       Kobi Cedillo MD   Interventional Cardiologist:   Kobi Cedillo MD   Referring Physician:           CAROLINA LOU     Procedures Performed   Procedures:               1.    Venous Access - Right Femoral     2.    RHC   3.    Ultrasound Guided Access   4.    PE - Thrombectomy   5.    PE - Thrombectomy   6.    Venogram   7.    PA Angiography   8.    PA Angiography     Indications:                Intermediate to High Risk Submassive  Pulmonary Embolism  Lab Visit Indication:      Intermediate to High Risk Submassive  Pulmonary Embolism    Conclusions:     Procedures:   The patient was brought to the cardiac catheterization laboratory.  She was prepped and draped in the typical sterile fashion.  The patient hemodynamics were closely monitored throughout the entire  procedure including her blood pressure, and heart rate,  respiratory rate, oxygenation and mental status.  Fentanyl and Versed  were given for sedation.    Using SonoSite guidance the right common femoral vein was visualized.  20 cc of 1% lidocaine was administered over the  puncture site.  Using SonoSite guidance the right common femoral vein  was punctured with a micropuncture system and a 6  [place] sheath was inserted.  Heparin was administered.  ACT was  checked during the procedure.    The 6 [place] sheath was then upsized to a 24 [place] Dryseal sheath.  Through the 6Fr sheath venography was performed of  the right common femoral vein and right external iliac vein.  No  thrombus was visualized.  A pigtail catheter was placed into the  inferior vena cava.  Venography was performed that demonstrated no  clot in the inferior vena cava.  Using a [place]-Bernardo catheter  a right heart cardiac catheterization was completed.     The [place]-Bernardo catheter was exchanged for a pigtail catheter which was  placed into the left pulmonary artery.  Pulmonary  angiography was performed that demonstrated extensive thrombus burden.  The pigtail catheter was then placed into the right  pulmonary artery that demonstratedextensive thrombus burden.       The pigtail catheter was placed into the right pulmonary artery and  exchanged for a multipurpose catheter.  The multipurpose  catheter was then exchanged for an Amplatz superstiff wire.  Over the  Amplatz Super Stiffwire a 24 [place] FlowTriever which  had been prepped according to  instructions was advanced.  Mechanical thrombectomy was performed of the right  interlobar pulmonary artery, truncus arteriosus and the right main  pulmonary artery.  Repeat pulmonary angiography  demonstrated significant reduction of thrombus burden.      Patient: SHIRLENE BERRY       MRN: [id number]  Study Date: 10/10/2023   12:21 PM      Page 1 of 6          The 24 [place] FlowTriever system was then placed into the left  pulmonary artery.  Mechanical thrombectomy was performed  with reduction of thrombus burden.  Through the 24 [place] FlowTriever  system a 20 [place] curved FlowTriever system which  had been prepped according to  instructions was inserted.  Thrombectomy was performed of the left lower lobe  segmental branches, left interlobar artery, left upper lob segmental  branches and left main pulmonary artery.  Repeat pulmonary  angiography demonstrated significant reduction of thrombus burden.     Postintervention hemodynamics were checked.     The FlowTriever system was removed from the patientâs body.  The 24  [place] Dryseal sheath was removed and a mattress  suture was delivered.  Pressure was held for 15 minutes.  Protamine  sulfate was administered prior to removal of the sheath.    The patient tolerated the procedure well.     Impressions:   Status post pre- and post- right heart cardiac catheterization with    --Reduction of sPAP from 47mmHg to 25mmHg   --Reduction of mPAP from 32mmHg to 17mmHg   --Increase in PAsat from 58.7% to 71.4%   --Increase in room air saturation from 87% to 100%     Status post venography of the right common femoral vein and right  iliac vein that demonstrated no thrombus    Status post venography of the inferior vena cava that demonstrated no  thrombus    Status post left and right pulmonary angiogram that demonstrated  extensive thrombus burden    Status post mechanical thrombectomy of the right interlobar pulmonary  artery, truncus arteriosus and the right main pulmonary  artery with significant reduction of thrombus burden     Status post mechanical thrombectomy of the left lower lobe segmental  branches, left interlobar artery, left upper lob segmental  branches and left main pulmonary artery with significant reduction of  thrombus burden    Recommendations:     Keep right leg straight for 6 hours following removal of sheath   Remove mattress suture in approximately 24 hours   Resume heparin drip with close monitoring of PTTwith plan to  transition to oral anticoagulant in the next 12-24 hours  Closely monitor for signs and symptoms of bleeding   Continue telemetry monitoring     Findings discussed with medicine/Dr. Lou     Procedure Narrative:   The risks and alternatives of the procedures and conscious sedation  were explained to the patient and informed consent was  obtained. The patient was brought to the cath lab and placed on the  exam table.  Access   Right femoral vein:   The puncture site was infiltrated with 1% Lidocaine. Vascular access  was obtained using Ultrasound guided micropuncture.     Patient seen and examined at bedside.    Overnight Events:   - NSR 70s  - s/p RHC and thrombectomy yesterday  - removal of mattress suture this AM    REVIEW OF SYSTEMS:  General: no fatigue/malaise, weight loss/gain.  Skin: no rashes.  Ophthalmologic: no blurred vision, no loss of vision. 	  ENT: no sore throat, rhinorrhea, sinus congestion.  Respiratory: no SOB, cough or wheeze.  CV: No chest pain. No palpitations.   Gastrointestinal:  no N/V/D, no melena/hematemesis/hematochezia.  Genitourinary: no dysuria/hesitancy or hematuria.  Musculoskeletal: no myalgias or arthralgias.  Neurological: no changes in vision or hearing, no lightheadedness/dizziness, no syncope/near syncope	  Psychiatric: no unusual stress/anxiety.   Hematology/Lymphatics: no unusual bleeding, bruising and no lymphadenopathy.  Endocrine: no unusual thirst.           Current Meds:  acetaminophen     Tablet .. 650 milliGRAM(s) Oral every 6 hours PRN  ascorbic acid 500 milliGRAM(s) Oral daily  cholecalciferol 1000 Unit(s) Oral daily  heparin   Injectable 4000 Unit(s) IV Push every 6 hours PRN  heparin   Injectable 9000 Unit(s) IV Push every 6 hours PRN  heparin  Infusion.  Unit(s)/Hr IV Continuous <Continuous>  loratadine 10 milliGRAM(s) Oral once  losartan 100 milliGRAM(s) Oral daily      Vitals:  T(F): 98.1 (10-11), Max: 99 (10-10)  HR: 90 (10-11) (88 - 108)  BP: 118/82 (10-11) (111/71 - 171/91)  RR: 18 (10-11)  SpO2: 95% (10-11)  I&O's Summary    10 Oct 2023 07:01  -  11 Oct 2023 07:00  --------------------------------------------------------  IN: 180 mL / OUT: 0 mL / NET: 180 mL        Physical Exam:  Appearance: No acute distress; well appearing  Eyes: PERRL, EOMI, pink conjunctiva  HEENT: Normal oral mucosa  Cardiovascular: RRR, S1, S2, no murmurs, rubs, or gallops; no edema; no JVD  Access: R groin some tenderness C/D/I no hematoma  Respiratory: Clear to auscultation bilaterally  Gastrointestinal: soft, non-tender, non-distended with normal bowel sounds  Musculoskeletal: No clubbing; no joint deformity   Extremities: 2+ DP/PT pulses   Neurologic: Non-focal  Lymphatic: No lymphadenopathy  Psychiatry: AAOx3, mood & affect appropriate  Skin: No rashes, ecchymoses, or cyanosis                          10.4   8.33  )-----------( 204      ( 11 Oct 2023 07:27 )             33.5     10-10    141  |  106  |  10  ----------------------------<  123<H>  3.9   |  22  |  0.61    Ca    8.8      10 Oct 2023 06:49    TPro  6.9  /  Alb  3.2<L>  /  TBili  0.6  /  DBili  x   /  AST  46<H>  /  ALT  26  /  AlkPhos  78  10-09    PT/INR - ( 09 Oct 2023 17:10 )   PT: 13.1 sec;   INR: 1.20 ratio         PTT - ( 11 Oct 2023 07:27 )  PTT:55.5 sec  CARDIAC MARKERS ( 10 Oct 2023 06:49 )  63 ng/L / x     / x     / x     / x     / x      CARDIAC MARKERS ( 09 Oct 2023 23:52 )  72 ng/L / x     / x     / 50 U/L / x     / 3.5 ng/mL  CARDIAC MARKERS ( 09 Oct 2023 17:10 )  74 ng/L / x     / x     / x     / x     / x              tudy Date:     10/10/2023   Name:           SHIRLENE BERRY   :            1970   (52 years)   Gender:         female   MR#:            [id number]   [facility]#:           [id number]   Patient Class:  Inpatient     Cath Lab Report    Diagnostic Cardiologist:       Kobi Cedillo MD   Interventional Cardiologist:   Kobi Cedillo MD   Referring Physician:           CAROLINA LOU     Procedures Performed   Procedures:               1.    Venous Access - Right Femoral     2.    RHC   3.    Ultrasound Guided Access   4.    PE - Thrombectomy   5.    PE - Thrombectomy   6.    Venogram   7.    PA Angiography   8.    PA Angiography     Indications:                Intermediate to High Risk Submassive  Pulmonary Embolism  Lab Visit Indication:      Intermediate to High Risk Submassive  Pulmonary Embolism    Conclusions:     Procedures:   The patient was brought to the cardiac catheterization laboratory.  She was prepped and draped in the typical sterile fashion.  The patient hemodynamics were closely monitored throughout the entire  procedure including her blood pressure, and heart rate,  respiratory rate, oxygenation and mental status.  Fentanyl and Versed  were given for sedation.    Using SonoSite guidance the right common femoral vein was visualized.  20 cc of 1% lidocaine was administered over the  puncture site.  Using SonoSite guidance the right common femoral vein  was punctured with a micropuncture system and a 6  [place] sheath was inserted.  Heparin was administered.  ACT was  checked during the procedure.    The 6 [place] sheath was then upsized to a 24 [place] Dryseal sheath.  Through the 6Fr sheath venography was performed of  the right common femoral vein and right external iliac vein.  No  thrombus was visualized.  A pigtail catheter was placed into the  inferior vena cava.  Venography was performed that demonstrated no  clot in the inferior vena cava.  Using a [place]-Bernardo catheter  a right heart cardiac catheterization was completed.     The [place]-Bernardo catheter was exchanged for a pigtail catheter which was  placed into the left pulmonary artery.  Pulmonary  angiography was performed that demonstrated extensive thrombus burden.  The pigtail catheter was then placed into the right  pulmonary artery that demonstratedextensive thrombus burden.       The pigtail catheter was placed into the right pulmonary artery and  exchanged for a multipurpose catheter.  The multipurpose  catheter was then exchanged for an Amplatz superstiff wire.  Over the  Amplatz Super Stiffwire a 24 [place] FlowTriever which  had been prepped according to  instructions was advanced.  Mechanical thrombectomy was performed of the right  interlobar pulmonary artery, truncus arteriosus and the right main  pulmonary artery.  Repeat pulmonary angiography  demonstrated significant reduction of thrombus burden.      Patient: SHIRLENE BERRY       MRN: [id number]  Study Date: 10/10/2023   12:21 PM      Page 1 of 6          The 24 [place] FlowTriever system was then placed into the left  pulmonary artery.  Mechanical thrombectomy was performed  with reduction of thrombus burden.  Through the 24 [place] FlowTriever  system a 20 [place] curved FlowTriever system which  had been prepped according to  instructions was inserted.  Thrombectomy was performed of the left lower lobe  segmental branches, left interlobar artery, left upper lob segmental  branches and left main pulmonary artery.  Repeat pulmonary  angiography demonstrated significant reduction of thrombus burden.     Postintervention hemodynamics were checked.     The FlowTriever system was removed from the patientâs body.  The 24  [place] Dryseal sheath was removed and a mattress  suture was delivered.  Pressure was held for 15 minutes.  Protamine  sulfate was administered prior to removal of the sheath.    The patient tolerated the procedure well.     Impressions:   Status post pre- and post- right heart cardiac catheterization with    --Reduction of sPAP from 47mmHg to 25mmHg   --Reduction of mPAP from 32mmHg to 17mmHg   --Increase in PAsat from 58.7% to 71.4%   --Increase in room air saturation from 87% to 100%     Status post venography of the right common femoral vein and right  iliac vein that demonstrated no thrombus    Status post venography of the inferior vena cava that demonstrated no  thrombus    Status post left and right pulmonary angiogram that demonstrated  extensive thrombus burden    Status post mechanical thrombectomy of the right interlobar pulmonary  artery, truncus arteriosus and the right main pulmonary  artery with significant reduction of thrombus burden     Status post mechanical thrombectomy of the left lower lobe segmental  branches, left interlobar artery, left upper lob segmental  branches and left main pulmonary artery with significant reduction of  thrombus burden    Recommendations:     Keep right leg straight for 6 hours following removal of sheath   Remove mattress suture in approximately 24 hours   Resume heparin drip with close monitoring of PTTwith plan to  transition to oral anticoagulant in the next 12-24 hours  Closely monitor for signs and symptoms of bleeding   Continue telemetry monitoring     Findings discussed with medicine/Dr. Lou     Procedure Narrative:   The risks and alternatives of the procedures and conscious sedation  were explained to the patient and informed consent was  obtained. The patient was brought to the cath lab and placed on the  exam table.  Access   Right femoral vein:   The puncture site was infiltrated with 1% Lidocaine. Vascular access  was obtained using Ultrasound guided micropuncture.

## 2023-10-11 NOTE — PROGRESS NOTE ADULT - ASSESSMENT
53 yo F w/ hx of HTN and recent abdominal surgery 4w ago who presented to [place] ED w/ LLE pain and SOB. Pt reports she underwent abd surg 4w ago. She was immobile for 1w then slowly mobilized. She had her abd drain removed 3d ago. Over the weekend she reported 2d of LLE swelling and pain and then began feeling mild dyspnea last night, notable w/ exertion as well as faint palpitations, feeling like her heart was racing. She then went to [place] ED for further eval. In their ED, pt was afebrile w/ HRs in the 110-130s, BP in the 130s/80s, satting 94% on RA. Labs showed WBC 13.7, normal Hgb and Plts, INR 1.12, D-Dimer 6497, normal Cr 0.8, HS-Trop I of 239.5, Pro-BNP 5130. CTPA showed extensive acute b/l PEs within the L and R main PA and multiple b/l lower order branches w/ an abnormal RV/LV ratio of 1.6 c/w RV strain.. LE Dopplers showed DVT within the L common femoral and profunda femoral veins as well as the L popliteal and posterior tibial veins and superficial thrombus in the L gastrocnemius and small saphenous veins. She was started on anticoagulation w/ heparin bolus and continued on a heparin gtt and a PERT call was initiated w/ ultimate plan for transfer to [facility] ED for further evaluation.       Problem/Plan - 1:  ·  Problem: Bilateral pulmonary embolism.   ·  Plan: Oxygenating well on RA.   Vascular help appreciated.   Heparin changed to PO Eliquis.     < from: TTE W or WO Ultrasound Enhancing Agent (10.11.23 @ 11:25) >  CONCLUSIONS:      1. Left ventricular systolic function is normal with an ejection fraction of 66 % by Zambrano's method of disks.   2. Moderately enlarged right ventricular cavity size andsystolic function.   3. Compared to the transthoracic echocardiogram performed on 10/9/2023 there have been no significant interval changes.    < end of copied text >  < from: TTE W or WO Ultrasound Enhancing Agent (10.09.23 @ 17:35) >  CONCLUSIONS:      1. Left ventricular wall thickness is normal. Left ventricular systolic function is hyperdynamic with an ejection fraction visually estimated at 70 to 75 %.   2. Normal filling pressure.   3. Severely enlarged right ventricular cavity size and wall thickness.   4. No pericardial effusion seen.   5. Mild tricuspid regurgitation.   6. Estimated pulmonary artery systolic pressure is 52 mmHg.   7. Findings were discussed with Cardiology fellow on 10/9/2023 at 7.40 pm. No prior echocardiogram is available for comparison.   8. Right ventricular systolic function is reduced with apical sparing consistent with acute pulmonary embolism (Brown's sign).   9. Technically difficult image quality.  10. There is no evidence of a left ventricular thrombus.    < end of copied text >   Problem/Plan - 2:  ·  Problem: Acute deep vein thrombosis (DVT).   ·  Plan: ON AC .  S/P Mechanical Thrombectomy per vascular.     Problem/Plan - 3:  ·  Problem: HTN (hypertension).   ·  Plan: BP meds with hold parameters.        Dispo : DC planning.  53 yo F w/ hx of HTN and recent abdominal surgery 4w ago who presented to [place] ED w/ LLE pain and SOB. Pt reports she underwent abd surg 4w ago. She was immobile for 1w then slowly mobilized. She had her abd drain removed 3d ago. Over the weekend she reported 2d of LLE swelling and pain and then began feeling mild dyspnea last night, notable w/ exertion as well as faint palpitations, feeling like her heart was racing. She then went to [place] ED for further eval. In their ED, pt was afebrile w/ HRs in the 110-130s, BP in the 130s/80s, satting 94% on RA. Labs showed WBC 13.7, normal Hgb and Plts, INR 1.12, D-Dimer 6497, normal Cr 0.8, HS-Trop I of 239.5, Pro-BNP 5130. CTPA showed extensive acute b/l PEs within the L and R main PA and multiple b/l lower order branches w/ an abnormal RV/LV ratio of 1.6 c/w RV strain.. LE Dopplers showed DVT within the L common femoral and profunda femoral veins as well as the L popliteal and posterior tibial veins and superficial thrombus in the L gastrocnemius and small saphenous veins. She was started on anticoagulation w/ heparin bolus and continued on a heparin gtt and a PERT call was initiated w/ ultimate plan for transfer [facility] ED for further evaluation.       Problem/Plan - 1:  ·  Problem: Bilateral pulmonary embolism.   ·  Plan: Oxygenating well on RA.   Vascular help appreciated.   Heparin changed to PO Eliquis.     < from: TTE W or WO Ultrasound Enhancing Agent (10.11.23 @ 11:25) >  CONCLUSIONS:      1. Left ventricular systolic function is normal with an ejection fraction of 66 % by Zambrano's method of disks.   2. Moderately enlarged right ventricular cavity size andsystolic function.   3. Compared to the transthoracic echocardiogram performed on 10/9/2023 there have been no significant interval changes.    < end of copied text >  < from: TTE W or WO Ultrasound Enhancing Agent (10.09.23 @ 17:35) >  CONCLUSIONS:      1. Left ventricular wall thickness is normal. Left ventricular systolic function is hyperdynamic with an ejection fraction visually estimated at 70 to 75 %.   2. Normal filling pressure.   3. Severely enlarged right ventricular cavity size and wall thickness.   4. No pericardial effusion seen.   5. Mild tricuspid regurgitation.   6. Estimated pulmonary artery systolic pressure is 52 mmHg.   7. Findings were discussed with Cardiology fellow on 10/9/2023 at 7.40 pm. No prior echocardiogram is available for comparison.   8. Right ventricular systolic function is reduced with apical sparing consistent with acute pulmonary embolism (Brown's sign).   9. Technically difficult image quality.  10. There is no evidence of a left ventricular thrombus.    < end of copied text >   Problem/Plan - 2:  ·  Problem: Acute deep vein thrombosis (DVT).   ·  Plan: ON AC .  S/P Mechanical Thrombectomy per vascular.     Problem/Plan - 3:  ·  Problem: HTN (hypertension).   ·  Plan: BP meds with hold parameters.        Dispo : DC planning.  53 yo F w/ hx of HTN and recent abdominal surgery 4w ago who presented to [place] ED w/ LLE pain and SOB. Pt reports she underwent abd surg 4w ago. She was immobile for 1w then slowly mobilized. She had her abd drain removed 3d ago. Over the weekend she reported 2d of LLE swelling and pain and then began feeling mild dyspnea last night, notable w/ exertion as well as faint palpitations, feeling like her heart was racing. She then went to [place] ED for further eval. In their ED, pt was afebrile w/ HRs in the 110-130s, BP in the 130s/80s, satting 94% on RA. Labs showed WBC 13.7, normal Hgb and Plts, INR 1.12, D-Dimer 6497, normal Cr 0.8, HS-Trop I of 239.5, Pro-BNP 5130. CTPA showed extensive acute b/l PEs within the L and R main PA and multiple b/l lower order branches w/ an abnormal RV/LV ratio of 1.6 c/w RV strain.. LE Dopplers showed DVT within the L common femoral and profunda femoral veins as well as the L popliteal and posterior tibial veins and superficial thrombus in the L gastrocnemius and small saphenous veins. She was started on anticoagulation w/ heparin bolus and continued on a heparin gtt and a PERT call was initiated w/ ultimate plan for transfer to [facility] ED for further evaluation.       Problem/Plan - 1:  ·  Problem: Bilateral pulmonary embolism.   ·  Plan: Oxygenating well on RA.   Vascular help appreciated.   Heparin changed to PO Eliquis.     < from: TTE W or WO Ultrasound Enhancing Agent (10.11.23 @ 11:25) >  CONCLUSIONS:      1. Left ventricular systolic function is normal with an ejection fraction of 66 % by Zambrano's method of disks.   2. Moderately enlarged right ventricular cavity size andsystolic function.   3. Compared to the transthoracic echocardiogram performed on 10/9/2023 there have been no significant interval changes.    < end of copied text >  < from: TTE W or WO Ultrasound Enhancing Agent (10.09.23 @ 17:35) >  CONCLUSIONS:      1. Left ventricular wall thickness is normal. Left ventricular systolic function is hyperdynamic with an ejection fraction visually estimated at 70 to 75 %.   2. Normal filling pressure.   3. Severely enlarged right ventricular cavity size and wall thickness.   4. No pericardial effusion seen.   5. Mild tricuspid regurgitation.   6. Estimated pulmonary artery systolic pressure is 52 mmHg.   7. Findings were discussed with Cardiology fellow on 10/9/2023 at 7.40 pm. No prior echocardiogram is available for comparison.   8. Right ventricular systolic function is reduced with apical sparing consistent with acute pulmonary embolism (Brown's sign).   9. Technically difficult image quality.  10. There is no evidence of a left ventricular thrombus.    < end of copied text >   Problem/Plan - 2:  ·  Problem: Acute deep vein thrombosis (DVT).   ·  Plan: ON AC .  S/P Mechanical Thrombectomy per vascular.     Problem/Plan - 3:  ·  Problem: HTN (hypertension).   ·  Plan: BP meds with hold parameters.        Dispo : DC planning.

## 2023-10-11 NOTE — PROGRESS NOTE ADULT - REASON FOR ADMISSION
SOB with left leg cramping

## 2023-10-11 NOTE — DISCHARGE NOTE PROVIDER - NSDCFUADDAPPT_GEN_ALL_CORE_FT
APPTS ARE READY TO BE MADE: [ ] YES    Best Family or Patient Contact (if needed):    Additional Information about above appointments (if needed):    1:   2:   3:     Other comments or requests:    APPTS ARE READY TO BE MADE: [x] YES    Best Family or Patient Contact (if needed):    Additional Information about above appointments (if needed):    1:   2:   3:     Other comments or requests:

## 2023-10-11 NOTE — DISCHARGE NOTE PROVIDER - PROVIDER TOKENS
PROVIDER:[TOKEN:[[id number]],FOLLOWUP:[1 week]] PROVIDER:[TOKEN:[[id number]],FOLLOWUP:[1 week]] PROVIDER:[TOKEN:[[id number]],FOLLOWUP:[1 week]]

## 2023-10-16 ENCOUNTER — TRANSCRIPTION ENCOUNTER (OUTPATIENT)
Age: 53
End: 2023-10-16

## 2023-10-17 ENCOUNTER — TRANSCRIPTION ENCOUNTER (OUTPATIENT)
Age: 53
End: 2023-10-17

## 2023-10-18 ENCOUNTER — APPOINTMENT (OUTPATIENT)
Dept: ULTRASOUND IMAGING | Facility: CLINIC | Age: 53
End: 2023-10-18
Payer: COMMERCIAL

## 2023-10-18 PROCEDURE — 93970 EXTREMITY STUDY: CPT

## 2023-10-18 RX ORDER — ASPIRIN ENTERIC COATED TABLETS 81 MG 81 MG/1
81 TABLET, DELAYED RELEASE ORAL
Qty: 30 | Refills: 3 | Status: ACTIVE | COMMUNITY
Start: 2023-10-18

## 2023-10-25 ENCOUNTER — OUTPATIENT (OUTPATIENT)
Dept: OUTPATIENT SERVICES | Facility: HOSPITAL | Age: 53
LOS: 1 days | End: 2023-10-25
Payer: COMMERCIAL

## 2023-10-25 DIAGNOSIS — Z98.890 OTHER SPECIFIED POSTPROCEDURAL STATES: Chronic | ICD-10-CM

## 2023-10-25 DIAGNOSIS — I82.453 ACUTE EMBOLISM AND THROMBOSIS OF PERONEAL VEIN, BILATERAL: ICD-10-CM

## 2023-10-25 PROCEDURE — 93306 TTE W/DOPPLER COMPLETE: CPT | Mod: 26

## 2023-10-25 PROCEDURE — 93306 TTE W/DOPPLER COMPLETE: CPT

## 2023-10-30 RX ORDER — APIXABAN 5 MG/1
5 TABLET, FILM COATED ORAL
Qty: 180 | Refills: 3 | Status: ACTIVE | COMMUNITY
Start: 2023-10-30 | End: 1900-01-01

## 2023-10-31 ENCOUNTER — TRANSCRIPTION ENCOUNTER (OUTPATIENT)
Age: 53
End: 2023-10-31

## 2023-10-31 RX ORDER — CLOTRIMAZOLE AND BETAMETHASONE DIPROPIONATE 10; .5 MG/G; MG/G
1-0.05 CREAM TOPICAL
Qty: 15 | Refills: 0 | Status: DISCONTINUED | COMMUNITY
Start: 2019-08-05 | End: 2023-10-31

## 2023-10-31 RX ORDER — PHENTERMINE HYDROCHLORIDE 30 MG/1
30 CAPSULE ORAL
Qty: 30 | Refills: 0 | Status: DISCONTINUED | COMMUNITY
Start: 2018-05-30 | End: 2023-10-31

## 2023-10-31 RX ORDER — LOSARTAN POTASSIUM 25 MG/1
25 TABLET, FILM COATED ORAL DAILY
Qty: 30 | Refills: 5 | Status: DISCONTINUED | COMMUNITY
Start: 2023-10-16 | End: 2023-10-31

## 2023-10-31 RX ORDER — ALPRAZOLAM 0.25 MG/1
0.25 TABLET ORAL
Qty: 20 | Refills: 0 | Status: DISCONTINUED | COMMUNITY
Start: 2018-06-13 | End: 2023-10-31

## 2023-11-08 LAB
ALBUMIN SERPL ELPH-MCNC: 4.3 G/DL
ALP BLD-CCNC: 105 U/L
ALT SERPL-CCNC: 22 U/L
ANION GAP SERPL CALC-SCNC: 13 MMOL/L
AST SERPL-CCNC: 14 U/L
BILIRUB SERPL-MCNC: 0.5 MG/DL
BUN SERPL-MCNC: 14 MG/DL
CALCIUM SERPL-MCNC: 9.8 MG/DL
CHLORIDE SERPL-SCNC: 100 MMOL/L
CO2 SERPL-SCNC: 26 MMOL/L
CREAT SERPL-MCNC: 0.57 MG/DL
EGFR: 111 ML/MIN/1.73M2
GLUCOSE SERPL-MCNC: 85 MG/DL
HCT VFR BLD CALC: 40.7 %
HGB BLD-MCNC: 12.5 G/DL
MAGNESIUM SERPL-MCNC: 1.9 MG/DL
MCHC RBC-ENTMCNC: 29.1 PG
MCHC RBC-ENTMCNC: 30.7 GM/DL
MCV RBC AUTO: 94.7 FL
PLATELET # BLD AUTO: 278 K/UL
POTASSIUM SERPL-SCNC: 4.1 MMOL/L
PROT SERPL-MCNC: 7 G/DL
RBC # BLD: 4.3 M/UL
RBC # FLD: 13.9 %
SODIUM SERPL-SCNC: 139 MMOL/L
WBC # FLD AUTO: 6.44 K/UL

## 2023-11-16 ENCOUNTER — APPOINTMENT (OUTPATIENT)
Dept: CARDIOLOGY | Facility: CLINIC | Age: 53
End: 2023-11-16
Payer: COMMERCIAL

## 2023-11-16 VITALS
WEIGHT: 233 LBS | HEIGHT: 66 IN | SYSTOLIC BLOOD PRESSURE: 156 MMHG | OXYGEN SATURATION: 100 % | DIASTOLIC BLOOD PRESSURE: 91 MMHG | BODY MASS INDEX: 37.45 KG/M2 | HEART RATE: 90 BPM

## 2023-11-16 PROCEDURE — 99214 OFFICE O/P EST MOD 30 MIN: CPT | Mod: 25

## 2023-11-16 PROCEDURE — 93000 ELECTROCARDIOGRAM COMPLETE: CPT

## 2023-11-16 RX ORDER — LOSARTAN POTASSIUM 100 MG/1
100 TABLET, FILM COATED ORAL DAILY
Qty: 30 | Refills: 5 | Status: ACTIVE | COMMUNITY
Start: 2023-11-16 | End: 1900-01-01

## 2023-11-21 ENCOUNTER — TRANSCRIPTION ENCOUNTER (OUTPATIENT)
Age: 53
End: 2023-11-21

## 2023-11-26 RX ORDER — LOSARTAN POTASSIUM 50 MG/1
50 TABLET, FILM COATED ORAL DAILY
Qty: 90 | Refills: 3 | Status: DISCONTINUED | COMMUNITY
Start: 2023-10-31 | End: 2023-11-26

## 2023-12-04 ENCOUNTER — TRANSCRIPTION ENCOUNTER (OUTPATIENT)
Age: 53
End: 2023-12-04

## 2023-12-05 LAB
ALBUMIN SERPL ELPH-MCNC: 4.3 G/DL
ALP BLD-CCNC: 113 U/L
ALT SERPL-CCNC: 19 U/L
ANION GAP SERPL CALC-SCNC: 11 MMOL/L
AST SERPL-CCNC: 15 U/L
BILIRUB SERPL-MCNC: 0.6 MG/DL
BUN SERPL-MCNC: 13 MG/DL
CALCIUM SERPL-MCNC: 9.7 MG/DL
CHLORIDE SERPL-SCNC: 99 MMOL/L
CO2 SERPL-SCNC: 28 MMOL/L
CREAT SERPL-MCNC: 0.59 MG/DL
EGFR: 110 ML/MIN/1.73M2
GLUCOSE SERPL-MCNC: 87 MG/DL
MAGNESIUM SERPL-MCNC: 1.9 MG/DL
POTASSIUM SERPL-SCNC: 4.3 MMOL/L
PROT SERPL-MCNC: 7 G/DL
SODIUM SERPL-SCNC: 138 MMOL/L

## 2023-12-19 ENCOUNTER — TRANSCRIPTION ENCOUNTER (OUTPATIENT)
Age: 53
End: 2023-12-19

## 2023-12-26 ENCOUNTER — APPOINTMENT (OUTPATIENT)
Dept: ULTRASOUND IMAGING | Facility: CLINIC | Age: 53
End: 2023-12-26
Payer: COMMERCIAL

## 2023-12-26 PROCEDURE — 93970 EXTREMITY STUDY: CPT

## 2023-12-27 ENCOUNTER — OUTPATIENT (OUTPATIENT)
Dept: OUTPATIENT SERVICES | Facility: HOSPITAL | Age: 53
LOS: 1 days | End: 2023-12-27
Payer: COMMERCIAL

## 2023-12-27 DIAGNOSIS — Z98.890 OTHER SPECIFIED POSTPROCEDURAL STATES: Chronic | ICD-10-CM

## 2023-12-27 DIAGNOSIS — I26.99 OTHER PULMONARY EMBOLISM WITHOUT ACUTE COR PULMONALE: ICD-10-CM

## 2023-12-27 PROCEDURE — 93306 TTE W/DOPPLER COMPLETE: CPT

## 2023-12-27 PROCEDURE — 93306 TTE W/DOPPLER COMPLETE: CPT | Mod: 26

## 2023-12-30 RX ORDER — CHOLECALCIFEROL (VITAMIN D3) 125 MCG
1 CAPSULE ORAL
Refills: 0 | DISCHARGE

## 2023-12-30 RX ORDER — ASCORBIC ACID 60 MG
1 TABLET,CHEWABLE ORAL
Refills: 0 | DISCHARGE

## 2023-12-30 RX ORDER — LOSARTAN POTASSIUM 100 MG/1
1 TABLET, FILM COATED ORAL
Refills: 0 | DISCHARGE

## 2023-12-30 RX ORDER — FEXOFENADINE HCL 30 MG
1 TABLET ORAL
Refills: 0 | DISCHARGE

## 2023-12-30 RX ORDER — ACETAMINOPHEN 500 MG
2 TABLET ORAL
Refills: 0 | DISCHARGE

## 2024-01-08 ENCOUNTER — TRANSCRIPTION ENCOUNTER (OUTPATIENT)
Age: 54
End: 2024-01-08

## 2024-02-15 ENCOUNTER — APPOINTMENT (OUTPATIENT)
Dept: CARDIOLOGY | Facility: CLINIC | Age: 54
End: 2024-02-15
Payer: COMMERCIAL

## 2024-02-15 ENCOUNTER — NON-APPOINTMENT (OUTPATIENT)
Age: 54
End: 2024-02-15

## 2024-02-15 VITALS
HEIGHT: 66 IN | WEIGHT: 233 LBS | DIASTOLIC BLOOD PRESSURE: 95 MMHG | OXYGEN SATURATION: 100 % | SYSTOLIC BLOOD PRESSURE: 139 MMHG | HEART RATE: 89 BPM | BODY MASS INDEX: 37.45 KG/M2

## 2024-02-15 DIAGNOSIS — I26.99 OTHER PULMONARY EMBOLISM W/OUT ACUTE COR PULMONALE: ICD-10-CM

## 2024-02-15 DIAGNOSIS — I10 ESSENTIAL (PRIMARY) HYPERTENSION: ICD-10-CM

## 2024-02-15 DIAGNOSIS — E78.5 HYPERLIPIDEMIA, UNSPECIFIED: ICD-10-CM

## 2024-02-15 DIAGNOSIS — E66.01 MORBID (SEVERE) OBESITY DUE TO EXCESS CALORIES: ICD-10-CM

## 2024-02-15 DIAGNOSIS — I82.493 ACUTE EMBOLISM AND THROMBOSIS OF OTHER SPECIFIED DEEP VEIN OF LOWER EXTREMITY, BILATERAL: ICD-10-CM

## 2024-02-15 DIAGNOSIS — Z79.01 LONG TERM (CURRENT) USE OF ANTICOAGULANTS: ICD-10-CM

## 2024-02-15 PROCEDURE — 99214 OFFICE O/P EST MOD 30 MIN: CPT | Mod: 25

## 2024-02-15 PROCEDURE — 93000 ELECTROCARDIOGRAM COMPLETE: CPT

## 2024-02-15 NOTE — PHYSICAL EXAM
[Well Developed] : well developed [Well Nourished] : well nourished [No Acute Distress] : no acute distress [Normal Conjunctiva] : normal conjunctiva [Normal Venous Pressure] : normal venous pressure [No Carotid Bruit] : no carotid bruit [Normal S1, S2] : normal S1, S2 [No Murmur] : no murmur [No Rub] : no rub [No Gallop] : no gallop [Clear Lung Fields] : clear lung fields [Good Air Entry] : good air entry [No Respiratory Distress] : no respiratory distress  [Soft] : abdomen soft [Non Tender] : non-tender [No Masses/organomegaly] : no masses/organomegaly [Normal Bowel Sounds] : normal bowel sounds [Normal Gait] : normal gait [No Cyanosis] : no cyanosis [No Clubbing] : no clubbing [No Varicosities] : no varicosities [Normal PT B/L] : normal PT B/L [Edema ___] : edema [unfilled] [No Rash] : no rash [No Skin Lesions] : no skin lesions [Moves all extremities] : moves all extremities [No Focal Deficits] : no focal deficits [Normal Speech] : normal speech [Alert and Oriented] : alert and oriented [Normal memory] : normal memory

## 2024-02-15 NOTE — ASU DISCHARGE PLAN (ADULT/PEDIATRIC). - DISCHARGE DATE
20-Jun-2018 Monitor glucoscans  Insulin  Metformin  Sliding scale insulin  Monitor labs  Low concentrated sweets diet

## 2024-02-17 PROBLEM — Z79.01 ANTICOAGULANT LONG-TERM USE: Status: ACTIVE | Noted: 2023-11-26

## 2024-02-17 PROBLEM — E66.01 OBESITY, MORBID, BMI 40.0-49.9: Status: ACTIVE | Noted: 2018-05-30

## 2024-02-17 PROBLEM — I26.99 PULMONARY EMBOLISM, OTHER: Status: ACTIVE | Noted: 2023-11-16

## 2024-02-17 PROBLEM — I82.493 DEEP VEIN THROMBOSIS (DVT) OF OTHER VEIN OF BOTH LOWER EXTREMITIES: Status: ACTIVE | Noted: 2023-10-17

## 2024-02-17 PROBLEM — E78.5 DYSLIPIDEMIA: Status: ACTIVE | Noted: 2023-11-26

## 2024-02-17 PROBLEM — I10 HYPERTENSION, UNSPECIFIED TYPE: Status: ACTIVE | Noted: 2023-11-16

## 2024-02-17 PROBLEM — I10 BENIGN HYPERTENSION: Status: ACTIVE | Noted: 2023-10-16

## 2024-02-17 NOTE — REASON FOR VISIT
[FreeTextEntry1] : 11/16/2023 Since being discharged the patient reports that she is clinically doing much better.  She is slowly returning to her baseline status.  She has not yet resumed her normal physical/gym activities but is increasing her functional status.  Denies any blood in her stool or urine.  Denies any chest pain/tightness/discomfort, fevers, chills, sweats, light sensation, dizziness or palpitations.  Did experience some mild swelling in her lower legs which resolved by the next day.  She is very active and performs greater than 8000 steps a day.  No cardiopulmonary complaints upon breast and current activity level.    ===== 2/15/2024  Denies any blood in her stool or urine.  Denies any chest pain/tightness/discomfort, fevers, chills, sweats, light sensation, dizziness or palpitations.  She is feeling very cold since being on the blood thinners.  No blood in her stool or urine.  She is not sweating as much when exercising.  Notes that increasing her exercise tolerance leads to some discomfort in her core.  She is now doing Yoga with relief of her back pain.  Works out a lot.  Went to a hematologist. No swelling in her legs.  Home SBP in the 120s.  Assessment/plan Submassive/intermediate-high risk pulmonary embolism Hypertension/white coat hypertension Abdominal surgery  The patient presents with an intermediate-high risk submassive pulmonary embolism that was likely secondary to recent abdominal surgery/immobility.    --Recent labs were reviewed with the patient.  Indications for repeat labs were gone over including CBC/CMP/CRP/lipid profile. --Short- and long-term dangers of elevated blood pressure was gone over.  Continue losartan 100mg PO QD.  Asked for her to send BP/HR log in few weeks.  May need to start HCTZ if BP is elevated. --Venous duplex study from 12/26/2023 demonstrated nonocclusive DVT noted in the left femoral, popliteal and posterior tibial veins.  Findings were discussed with the patient. --TTE from 12/27/2023 demonstrated EF of 55 to 60% with normal left ventricular cavity.  The right ventricle was not well-visualized but appears to grossly normal size and systolic function.  Normal left atrium and right atrium.  Mild aortic regurgitation.  Trace mitral regurgitation.  Findings were discussed with the patient. --Patient on 10/10/2023 underwent successful mechanical thrombectomy of bilateral pulmonary embolism.  She had normalization of her pulmonary pressures following completion of the procedure.   --Continue Eliquis 5 mg by mouth twice a day for 6 months for provoked PE/DVT.  Indications for the blood thinners were reviewed.  Signs and symptoms were discussed.  She was told to avoid all NSAIDs.  May take Tylenol for discomfort.  MTHFR gene hererozygous. --Cholesterol (, HDL 68, , VLDL 39, Trig 197) on 7/31/2023.  Recommend repeat lipid profile 3-4 months.  Discussed lifestyle management. --It is recommended that she undergo lower extremity venous duplex.  Indications for the studies were reviewed. --TTE on 10/19/2023 demonstrated left ventricular systolic function was hyperdynamic with EF 7075%.  Severely enlarged right ventricular cavity size and wall thickness.  No pericardial effusion.  PASP 52 mmHg.  RV systolic functioning is reduced with apical sparing consistent with acute pulmonary embolism. --CT PA showed extensive acute b/l PEs within the L and R main PA and multiple b/l lower order branches w/ an abnormal RV/LV ratio of 1.6 c/w RV strain. --LE Dopplers showed DVT within the L common femoral and profunda femoral veins as well as the L popliteal and posterior tibial veins and superficial thrombus in the L gastrocnemius and small saphenous veins. --Continue aspirin 81mg daily.  Signs and symptoms of bleeding were reviewed. --It is recommended that in 3 months that a repeat lower extremity venous duplex study be performed. --She will need to undergo age-appropriate malignancy screening.  Patient reports that she is up-to-date when it comes to colonoscopy, Pap smear and mammogram.  All studies per report are within normal limits. --It is recommend the patient be seen by hematology for work-up of underlying hypercoagulable state. --Continue losartan 50 mg daily. --EKG performed due to history of PFO/HTN/dyslipidemia.  All questions and concerns of the patient and her  were addressed.  Recommend patient send BP/HR in 2-3 weeks.  Recommend venous duplex study/March.  Recommend repeat lipid/blood work profile.  Follow-up 4-6 months.

## 2024-03-01 ENCOUNTER — TRANSCRIPTION ENCOUNTER (OUTPATIENT)
Age: 54
End: 2024-03-01

## 2024-03-12 ENCOUNTER — APPOINTMENT (OUTPATIENT)
Dept: ULTRASOUND IMAGING | Facility: CLINIC | Age: 54
End: 2024-03-12
Payer: COMMERCIAL

## 2024-03-12 DIAGNOSIS — E78.5 HYPERLIPIDEMIA, UNSPECIFIED: ICD-10-CM

## 2024-03-12 PROCEDURE — 93970 EXTREMITY STUDY: CPT

## 2024-03-13 PROBLEM — E78.5 DYSLIPIDEMIA: Status: ACTIVE | Noted: 2024-03-13

## 2024-03-13 LAB
ALBUMIN SERPL ELPH-MCNC: 4.2 G/DL
ALP BLD-CCNC: 103 U/L
ALT SERPL-CCNC: 13 U/L
ANION GAP SERPL CALC-SCNC: 11 MMOL/L
AST SERPL-CCNC: 13 U/L
BILIRUB SERPL-MCNC: 0.6 MG/DL
BUN SERPL-MCNC: 12 MG/DL
CALCIUM SERPL-MCNC: 9.1 MG/DL
CHLORIDE SERPL-SCNC: 101 MMOL/L
CHOLEST SERPL-MCNC: 193 MG/DL
CO2 SERPL-SCNC: 28 MMOL/L
CREAT SERPL-MCNC: 0.67 MG/DL
CRP SERPL-MCNC: 17 MG/L
EGFR: 106 ML/MIN/1.73M2
GLUCOSE SERPL-MCNC: 85 MG/DL
HCT VFR BLD CALC: 39.5 %
HDLC SERPL-MCNC: 53 MG/DL
HGB BLD-MCNC: 12.6 G/DL
LDLC SERPL CALC-MCNC: 112 MG/DL
MCHC RBC-ENTMCNC: 29.4 PG
MCHC RBC-ENTMCNC: 31.9 GM/DL
MCV RBC AUTO: 92.1 FL
NONHDLC SERPL-MCNC: 140 MG/DL
PLATELET # BLD AUTO: 226 K/UL
POTASSIUM SERPL-SCNC: 4 MMOL/L
PROT SERPL-MCNC: 6.6 G/DL
RBC # BLD: 4.29 M/UL
RBC # FLD: 13.6 %
SODIUM SERPL-SCNC: 139 MMOL/L
TRIGL SERPL-MCNC: 154 MG/DL
WBC # FLD AUTO: 7.33 K/UL

## 2024-03-13 RX ORDER — ROSUVASTATIN CALCIUM 5 MG/1
5 TABLET, FILM COATED ORAL DAILY
Qty: 30 | Refills: 5 | Status: ACTIVE | COMMUNITY
Start: 2024-03-13 | End: 2024-09-09

## 2024-03-14 ENCOUNTER — TRANSCRIPTION ENCOUNTER (OUTPATIENT)
Age: 54
End: 2024-03-14

## 2024-03-18 ENCOUNTER — TRANSCRIPTION ENCOUNTER (OUTPATIENT)
Age: 54
End: 2024-03-18

## 2024-03-20 ENCOUNTER — TRANSCRIPTION ENCOUNTER (OUTPATIENT)
Age: 54
End: 2024-03-20

## 2024-04-08 ENCOUNTER — TRANSCRIPTION ENCOUNTER (OUTPATIENT)
Age: 54
End: 2024-04-08

## 2024-08-14 ENCOUNTER — NON-APPOINTMENT (OUTPATIENT)
Age: 54
End: 2024-08-14

## 2024-08-15 ENCOUNTER — NON-APPOINTMENT (OUTPATIENT)
Age: 54
End: 2024-08-15

## 2024-08-15 ENCOUNTER — APPOINTMENT (OUTPATIENT)
Dept: CARDIOLOGY | Facility: CLINIC | Age: 54
End: 2024-08-15

## 2024-08-15 VITALS — OXYGEN SATURATION: 100 % | WEIGHT: 233 LBS | BODY MASS INDEX: 37.45 KG/M2 | HEART RATE: 89 BPM | HEIGHT: 66 IN

## 2024-08-15 PROCEDURE — 99214 OFFICE O/P EST MOD 30 MIN: CPT

## 2024-08-15 PROCEDURE — G2211 COMPLEX E/M VISIT ADD ON: CPT | Mod: NC

## 2024-08-15 PROCEDURE — 93000 ELECTROCARDIOGRAM COMPLETE: CPT

## 2024-08-15 NOTE — REASON FOR VISIT
[FreeTextEntry1] : 11/16/2023 Since being discharged the patient reports that she is clinically doing much better.  She is slowly returning to her baseline status.  She has not yet resumed her normal physical/gym activities but is increasing her functional status.  Denies any blood in her stool or urine.  Denies any chest pain/tightness/discomfort, fevers, chills, sweats, light sensation, dizziness or palpitations.  Did experience some mild swelling in her lower legs which resolved by the next day.  She is very active and performs greater than 8000 steps a day.  No cardiopulmonary complaints upon breast and current activity level.    ===== 2/15/2024  Denies any blood in her stool or urine.  Denies any chest pain/tightness/discomfort, fevers, chills, sweats, light sensation, dizziness or palpitations.  She is feeling very cold since being on the blood thinners.  No blood in her stool or urine.  She is not sweating as much when exercising.  Notes that increasing her exercise tolerance leads to some discomfort in her core.  She is now doing Yoga with relief of her back pain.  Works out a lot.  Went to a hematologist. No swelling in her legs.  Home SBP in the 120s.  ================= 8/15/2024 When she checks BP was 120-130/80-90 with HR 70.  Denies any blood in her stool or urine.  Denies any chest pain/tightness/discomfort, fevers, chills, sweats, light sensation, dizziness or palpitations.  She does weight training, cardio twice a week and yoga once a week.  She is 90lbs down from where she is.  Perimenopausal.    Assessment/plan Submassive/intermediate-high risk pulmonary embolism 9birth control/surgery - provoked) Hypertension/white coat hypertension Abdominal surgery  The patient presents with an intermediate-high risk submassive pulmonary embolism that was likely secondary to recent abdominal surgery/immobility.    --Recent labs were reviewed with the patient.  Lipid panel was gone over.  She wants to not take the statin at this time.  Wishes to treat it in a conservative fashion.  Recommend repeat lipid profile 6  months.  Discussed lifestyle management. --Short- and long-term dangers of elevated blood pressure was gone over.  Continue losartan 100mg PO QD.  Asked for her to send BP/HR log in few weeks.  May need to start HCTZ if BP is elevated. --Venous duplex study from 3/12/2024 demonstrated nonocclusive persistent chronic DVT noted in the left femoral and popliteal veins. No right-sided DVT. --Venous duplex study from 12/26/2023 demonstrated nonocclusive DVT noted in the left femoral, popliteal and posterior tibial veins.  Findings were discussed with the patient. --TTE from 12/27/2023 demonstrated EF of 55 to 60% with normal left ventricular cavity.  The right ventricle was not well-visualized but appears to grossly normal size and systolic function.  Normal left atrium and right atrium.  Mild aortic regurgitation.  Trace mitral regurgitation.  Findings were discussed with the patient. --Patient on 10/10/2023 underwent successful mechanical thrombectomy of bilateral pulmonary embolism.  She had normalization of her pulmonary pressures following completion of the procedure.   --Was on Eliquis 5 mg by mouth twice a day for 6 months for provoked PE/DVT.  Indications for the blood thinners were reviewed.  Signs and symptoms were discussed.  She was told to avoid all NSAIDs.  May take Tylenol for discomfort.  MTHFR gene hererozygous. --TTE on 10/19/2023 demonstrated left ventricular systolic function was hyperdynamic with EF 7075%.  Severely enlarged right ventricular cavity size and wall thickness.  No pericardial effusion.  PASP 52 mmHg.  RV systolic functioning is reduced with apical sparing consistent with acute pulmonary embolism. --CT PA showed extensive acute b/l PEs within the L and R main PA and multiple b/l lower order branches w/ an abnormal RV/LV ratio of 1.6 c/w RV strain. --LE Dopplers showed DVT within the L common femoral and profunda femoral veins as well as the L popliteal and posterior tibial veins and superficial thrombus in the L gastrocnemius and small saphenous veins. --Continue aspirin 81mg daily.  Signs and symptoms of bleeding were reviewed. --It is recommended that in 3 months that a repeat lower extremity venous duplex study be performed. --She will need to undergo age-appropriate malignancy screening.  Patient reports that she is up-to-date when it comes to colonoscopy, Pap smear and mammogram.  All studies per report are within normal limits. --It is recommend the patient be seen by hematology for work-up of underlying hypercoagulable state. --EKG performed due to history of PFO/HTN/dyslipidemia.  All questions and concerns of the patient were addressed.    Recommend repeat lipid/blood work profile.  Follow-up 4-6 months.

## 2025-02-27 ENCOUNTER — NON-APPOINTMENT (OUTPATIENT)
Age: 55
End: 2025-02-27

## 2025-02-27 ENCOUNTER — APPOINTMENT (OUTPATIENT)
Dept: CARDIOLOGY | Facility: CLINIC | Age: 55
End: 2025-02-27
Payer: COMMERCIAL

## 2025-02-27 VITALS
HEART RATE: 77 BPM | SYSTOLIC BLOOD PRESSURE: 145 MMHG | BODY MASS INDEX: 37.61 KG/M2 | WEIGHT: 233 LBS | OXYGEN SATURATION: 100 % | DIASTOLIC BLOOD PRESSURE: 83 MMHG

## 2025-02-27 DIAGNOSIS — E78.5 HYPERLIPIDEMIA, UNSPECIFIED: ICD-10-CM

## 2025-02-27 DIAGNOSIS — I82.493 ACUTE EMBOLISM AND THROMBOSIS OF OTHER SPECIFIED DEEP VEIN OF LOWER EXTREMITY, BILATERAL: ICD-10-CM

## 2025-02-27 DIAGNOSIS — I10 ESSENTIAL (PRIMARY) HYPERTENSION: ICD-10-CM

## 2025-02-27 DIAGNOSIS — E66.01 MORBID (SEVERE) OBESITY DUE TO EXCESS CALORIES: ICD-10-CM

## 2025-02-27 DIAGNOSIS — I26.99 OTHER PULMONARY EMBOLISM W/OUT ACUTE COR PULMONALE: ICD-10-CM

## 2025-02-27 DIAGNOSIS — Z79.01 LONG TERM (CURRENT) USE OF ANTICOAGULANTS: ICD-10-CM

## 2025-02-27 PROCEDURE — G2211 COMPLEX E/M VISIT ADD ON: CPT | Mod: NC

## 2025-02-27 PROCEDURE — 99214 OFFICE O/P EST MOD 30 MIN: CPT

## 2025-02-27 PROCEDURE — 93000 ELECTROCARDIOGRAM COMPLETE: CPT

## 2025-06-20 ENCOUNTER — NON-APPOINTMENT (OUTPATIENT)
Age: 55
End: 2025-06-20

## 2025-07-01 ENCOUNTER — APPOINTMENT (OUTPATIENT)
Dept: ORTHOPEDIC SURGERY | Facility: CLINIC | Age: 55
End: 2025-07-01
Payer: COMMERCIAL

## 2025-07-01 VITALS — WEIGHT: 233 LBS | HEIGHT: 66 IN | BODY MASS INDEX: 37.45 KG/M2

## 2025-07-01 PROBLEM — M25.512 CHRONIC LEFT SHOULDER PAIN: Status: ACTIVE | Noted: 2025-07-01

## 2025-07-01 PROCEDURE — 99203 OFFICE O/P NEW LOW 30 MIN: CPT | Mod: 25

## 2025-07-01 PROCEDURE — 73030 X-RAY EXAM OF SHOULDER: CPT | Mod: LT

## 2025-07-01 PROCEDURE — 20610 DRAIN/INJ JOINT/BURSA W/O US: CPT | Mod: LT

## 2025-07-02 ENCOUNTER — APPOINTMENT (OUTPATIENT)
Dept: ORTHOPEDIC SURGERY | Facility: CLINIC | Age: 55
End: 2025-07-02

## 2025-07-24 ENCOUNTER — APPOINTMENT (OUTPATIENT)
Dept: CARDIOTHORACIC SURGERY | Facility: CLINIC | Age: 55
End: 2025-07-24
Payer: COMMERCIAL

## 2025-07-24 VITALS
OXYGEN SATURATION: 100 % | WEIGHT: 233 LBS | HEART RATE: 87 BPM | DIASTOLIC BLOOD PRESSURE: 86 MMHG | SYSTOLIC BLOOD PRESSURE: 144 MMHG | HEIGHT: 66 IN | BODY MASS INDEX: 37.45 KG/M2 | RESPIRATION RATE: 16 BRPM

## 2025-07-24 DIAGNOSIS — E78.5 HYPERLIPIDEMIA, UNSPECIFIED: ICD-10-CM

## 2025-07-24 DIAGNOSIS — I10 ESSENTIAL (PRIMARY) HYPERTENSION: ICD-10-CM

## 2025-07-24 DIAGNOSIS — E66.01 MORBID (SEVERE) OBESITY DUE TO EXCESS CALORIES: ICD-10-CM

## 2025-07-24 DIAGNOSIS — I26.99 OTHER PULMONARY EMBOLISM W/OUT ACUTE COR PULMONALE: ICD-10-CM

## 2025-07-24 PROCEDURE — 99214 OFFICE O/P EST MOD 30 MIN: CPT

## 2025-07-24 PROCEDURE — G2211 COMPLEX E/M VISIT ADD ON: CPT | Mod: NC

## 2025-07-24 RX ORDER — TIRZEPATIDE 15 MG/.5ML
INJECTION, SOLUTION SUBCUTANEOUS
Refills: 0 | Status: ACTIVE | COMMUNITY

## 2025-07-24 RX ORDER — PROGESTERONE 100 MG/1
100 CAPSULE ORAL DAILY
Refills: 0 | Status: ACTIVE | COMMUNITY
Start: 2025-07-24

## 2025-07-24 RX ORDER — ESTRADIOL 0.03 MG/D
0.03 PATCH, EXTENDED RELEASE TRANSDERMAL
Refills: 0 | Status: ACTIVE | COMMUNITY
Start: 2025-07-24

## 2025-08-25 ENCOUNTER — TRANSCRIPTION ENCOUNTER (OUTPATIENT)
Age: 55
End: 2025-08-25

## 2025-08-25 RX ORDER — AMLODIPINE BESYLATE 5 MG/1
5 TABLET ORAL DAILY
Qty: 90 | Refills: 3 | Status: ACTIVE | COMMUNITY
Start: 2025-08-25 | End: 1900-01-01

## 2025-08-26 ENCOUNTER — TRANSCRIPTION ENCOUNTER (OUTPATIENT)
Age: 55
End: 2025-08-26